# Patient Record
Sex: FEMALE | Race: BLACK OR AFRICAN AMERICAN | Employment: UNEMPLOYED | URBAN - METROPOLITAN AREA
[De-identification: names, ages, dates, MRNs, and addresses within clinical notes are randomized per-mention and may not be internally consistent; named-entity substitution may affect disease eponyms.]

---

## 2018-02-08 LAB
C TRACH DNA SPEC QL PROBE+SIG AMP: NEGATIVE
HBV SURFACE AG SERPL QL IA: NEGATIVE
HCT VFR BLD AUTO: 35.5 %
HEMOGLOBIN: 11 G/DL (ref 11.7–15.7)
HIV 1+2 AB+HIV1 P24 AG SERPL QL IA: NEGATIVE
N GONORRHOEA DNA SPEC QL PROBE+SIG AMP: NEGATIVE
PAP: NEGATIVE
RUBELLA ABY IGG: NORMAL

## 2018-06-14 LAB
GLU GEST SCREEN 1HR 50G: 150
HEMOGLOBIN: 9.9 G/DL (ref 11.7–15.7)

## 2018-07-05 LAB
GLU, 1 HOUR, 100 G: 146
GLU, 2 HOUR, 100 G: 160
GLU, 3 HOUR, 100 G: 81

## 2018-09-10 ENCOUNTER — OFFICE VISIT (OUTPATIENT)
Dept: OBGYN | Facility: CLINIC | Age: 28
End: 2018-09-10
Attending: ADVANCED PRACTICE MIDWIFE
Payer: MEDICAID

## 2018-09-10 VITALS
DIASTOLIC BLOOD PRESSURE: 78 MMHG | BODY MASS INDEX: 32.4 KG/M2 | HEIGHT: 65 IN | SYSTOLIC BLOOD PRESSURE: 126 MMHG | HEART RATE: 105 BPM | WEIGHT: 194.5 LBS

## 2018-09-10 DIAGNOSIS — O09.93 SUPERVISION OF HIGH-RISK PREGNANCY, THIRD TRIMESTER: Primary | ICD-10-CM

## 2018-09-10 PROBLEM — Z34.83 ENCOUNTER FOR SUPERVISION OF OTHER NORMAL PREGNANCY IN THIRD TRIMESTER: Status: ACTIVE | Noted: 2018-09-10

## 2018-09-10 LAB
ABO + RH BLD: NORMAL
ABO + RH BLD: NORMAL
ALT SERPL W P-5'-P-CCNC: 15 U/L (ref 0–50)
AST SERPL W P-5'-P-CCNC: 21 U/L (ref 0–45)
BLD GP AB SCN SERPL QL: NORMAL
BLOOD BANK CMNT PATIENT-IMP: NORMAL
CREAT SERPL-MCNC: 0.5 MG/DL (ref 0.52–1.04)
CREAT UR-MCNC: 115 MG/DL
ERYTHROCYTE [DISTWIDTH] IN BLOOD BY AUTOMATED COUNT: 14.4 % (ref 10–15)
GFR SERPL CREATININE-BSD FRML MDRD: >90 ML/MIN/1.7M2
HCT VFR BLD AUTO: 33.2 % (ref 35–47)
HGB BLD-MCNC: 10.1 G/DL (ref 11.7–15.7)
MCH RBC QN AUTO: 22.8 PG (ref 26.5–33)
MCHC RBC AUTO-ENTMCNC: 30.4 G/DL (ref 31.5–36.5)
MCV RBC AUTO: 75 FL (ref 78–100)
PLATELET # BLD AUTO: 228 10E9/L (ref 150–450)
PROT UR-MCNC: 0.25 G/L
PROT/CREAT 24H UR: 0.22 G/G CR (ref 0–0.2)
RBC # BLD AUTO: 4.43 10E12/L (ref 3.8–5.2)
SPECIMEN EXP DATE BLD: NORMAL
URATE SERPL-MCNC: 4.3 MG/DL (ref 2.6–6)
WBC # BLD AUTO: 9 10E9/L (ref 4–11)

## 2018-09-10 PROCEDURE — 86850 RBC ANTIBODY SCREEN: CPT | Performed by: ADVANCED PRACTICE MIDWIFE

## 2018-09-10 PROCEDURE — 36415 COLL VENOUS BLD VENIPUNCTURE: CPT | Performed by: ADVANCED PRACTICE MIDWIFE

## 2018-09-10 PROCEDURE — 87591 N.GONORRHOEAE DNA AMP PROB: CPT | Performed by: ADVANCED PRACTICE MIDWIFE

## 2018-09-10 PROCEDURE — 87491 CHLMYD TRACH DNA AMP PROBE: CPT | Performed by: ADVANCED PRACTICE MIDWIFE

## 2018-09-10 PROCEDURE — 85027 COMPLETE CBC AUTOMATED: CPT | Performed by: ADVANCED PRACTICE MIDWIFE

## 2018-09-10 PROCEDURE — 82565 ASSAY OF CREATININE: CPT | Performed by: ADVANCED PRACTICE MIDWIFE

## 2018-09-10 PROCEDURE — 84156 ASSAY OF PROTEIN URINE: CPT | Performed by: ADVANCED PRACTICE MIDWIFE

## 2018-09-10 PROCEDURE — 87653 STREP B DNA AMP PROBE: CPT | Performed by: ADVANCED PRACTICE MIDWIFE

## 2018-09-10 PROCEDURE — 86900 BLOOD TYPING SEROLOGIC ABO: CPT | Performed by: ADVANCED PRACTICE MIDWIFE

## 2018-09-10 PROCEDURE — 84450 TRANSFERASE (AST) (SGOT): CPT | Performed by: ADVANCED PRACTICE MIDWIFE

## 2018-09-10 PROCEDURE — 84550 ASSAY OF BLOOD/URIC ACID: CPT | Performed by: ADVANCED PRACTICE MIDWIFE

## 2018-09-10 PROCEDURE — 86901 BLOOD TYPING SEROLOGIC RH(D): CPT | Performed by: ADVANCED PRACTICE MIDWIFE

## 2018-09-10 PROCEDURE — 84460 ALANINE AMINO (ALT) (SGPT): CPT | Performed by: ADVANCED PRACTICE MIDWIFE

## 2018-09-10 ASSESSMENT — ANXIETY QUESTIONNAIRES
1. FEELING NERVOUS, ANXIOUS, OR ON EDGE: SEVERAL DAYS
5. BEING SO RESTLESS THAT IT IS HARD TO SIT STILL: SEVERAL DAYS
3. WORRYING TOO MUCH ABOUT DIFFERENT THINGS: NOT AT ALL
2. NOT BEING ABLE TO STOP OR CONTROL WORRYING: NOT AT ALL
6. BECOMING EASILY ANNOYED OR IRRITABLE: MORE THAN HALF THE DAYS
GAD7 TOTAL SCORE: 5
7. FEELING AFRAID AS IF SOMETHING AWFUL MIGHT HAPPEN: NOT AT ALL

## 2018-09-10 ASSESSMENT — PAIN SCALES - GENERAL: PAINLEVEL: NO PAIN (0)

## 2018-09-10 ASSESSMENT — PATIENT HEALTH QUESTIONNAIRE - PHQ9: 5. POOR APPETITE OR OVEREATING: SEVERAL DAYS

## 2018-09-10 NOTE — LETTER
Date:September 18, 2018      Patient was self referred, no letter generated. Do not send.        [facility] Information

## 2018-09-10 NOTE — MR AVS SNAPSHOT
After Visit Summary   9/10/2018    Libby Mahajan    MRN: [id number]           Patient Information     Date Of Birth          1990        Visit Information        Provider Department      9/10/2018 1:30 PM Alexander Galaviz CNM [facility]        Today's Diagnoses     Supervision of high-risk pregnancy, third trimester    -  1    Large for dates           Follow-ups after your visit        Follow-up notes from your care team     Return in about 1 week (around 9/17/2018) for ultrasound and 30 minute ed appt.      Your next 10 appointments already scheduled     Sep 17, 2018 10:00 AM CDT   (Arrive by 9:45 AM)   US OB SINGLE FOLLOW UP REPEAT with URSUS1   [facility] ([facility])    [facility]  3rd Floor, [address]  [address] [place] [address] [phone]           How do I prepare for my exam? (Food and drink instructions) Drink four 8-ounce glasses of fluid an hour before your exam. If you need to empty your bladder before your exam, try to release only a little urine. Then, drink another glass of fluid.  How do I prepare for my exam? (Other instructions) You may have up to two family members in the exam room. If you bring a small child, an adult must be there to care for him or her. No video or camera photography during the procedure.  What should I wear: Wear comfortable clothes.  How long does the exam take: Most ultrasounds take 30 to 60 minutes.  What should I bring: Bring a list of your medicines, including vitamins, minerals and over-the-counter drugs. It is safest to leave personal items at home.  Do I need a :  No  is needed.  What do I need to tell my doctor: Tell your doctor about any allergies you may have.  What should I do after the exam: No restrictions, You may resume normal activities.  What is this test: An ultrasound uses sound waves to make pictures of  "the body. Sound waves do not cause pain. The only discomfort may be the pressure of the wand against your skin or full bladder.  Who should I call with questions: If you have any questions, please call the Imaging Department where you will have your exam. Directions, parking instructions, and other information is available on our website, [email or web address].            Sep 17, 2018 10:30 AM CDT   RETURN OB with AHMET Xie CNM   [facility] ([facility])    [facility]  [address]  [address]  [facility] [address] [phone]              Who to contact     Please call your clinic at [phone] to:    Ask questions about your health    Make or cancel appointments    Discuss your medicines    Learn about your test results    Speak to your doctor            Additional Information About Your Visit        Care EveryWhere ID     This is your Care EveryWhere ID. This could be used by other organizations to access your [place] medical records  [id number]        Your Vitals Were     Pulse Height Last Period BMI (Body Mass Index)          105 1.651 m (5' 5\") 12/27/2017 32.37 kg/m2         Blood Pressure from Last 3 Encounters:   09/10/18 126/78    Weight from Last 3 Encounters:   09/10/18 88.2 kg (194 lb 8 oz)              We Performed the Following     ABO/Rh Type and Screen     ALT     AST     CBC with Platelets     Chlamydia trachomatis PCR     Creatinine urine calculation only     Creatinine     Gonorrhea PCR     Group B strep PCR     Protein  random urine with Creat Ratio     Uric acid        Primary Care Provider    None Specified       No primary provider on file.        Equal Access to Services     JENNIFER FIGUEROA : Hadii vipul Leos, wayaseminda lurohitadaha, qaybta kaalmada ladi mascorro . So [facility] [phone].    ATENCIÓN: Si habla español, tiene a bhat disposición servicios gratuitos de " wil lingüísticyanira. Raj al [phone].    We comply with applicable federal civil rights laws and [place] laws. We do not discriminate on the basis of race, color, national origin, age, disability, sex, sexual orientation, or gender identity.            Thank you!     Thank you for choosing [facility]  for your care. Our goal is always to provide you with excellent care. Hearing back from our patients is one way we can continue to improve our services. Please take a few minutes to complete the written survey that you may receive in the mail after your visit with us. Thank you!             Your Updated Medication List - Protect others around you: Learn how to safely use, store and throw away your medicines at www.disposemymeds.org.      Notice  As of 9/10/2018 11:59 PM    You have not been prescribed any medications.

## 2018-09-10 NOTE — PROGRESS NOTES
"SUBJECTIVE:   Libby is a 28 year old female who presents to clinic for a TRANSFER OF CARE visit.       at 36w5d with Estimated Date of Delivery: Oct 3, 2018 based on LMP (per patient report. No ultrasound records available).     Transfer of care from [facility].   Pt reports she received regular prenatal care. Moved here one month ago- did not explain why she did not present for care earlier. Present at appointment with her mother. , \"Jurgen,\" is in the [facility].   - States she had her records but lost them during the move.     Per pt report:   - Unclear history of elevated blood pressures:   -  Pt reports elevated BP early in pregnancy and was on anti-HTN medication.    - States meds were d'c'ed at approx 5 months of pregnancy.    - Reports that she had elevated BPs after delivery with previous pregnancy.  - Never diagnosed with CHTN,   - Agreeable to baseline pre-e labs today. Normotensive in clinic today.  - Failed 1 hour glucose, PASSED 3 hour   - States she did 3 hour glucose test this pregnancy and passed  - Gallstones- during this pregnancy  - Does not think she had GC/CT testing. Agreeable to testing today.    Reports no other problems in pregnancy.     OB history:   - 2 prior    - 2009 39+0, Girl, 6lb 13oz   - 3/8/2016 @ 39+2, Boy, 8lb 12oz    Denies cramping/contractions, vaginal bleeding, discharge or leakage of fluid. Report +fetal movement.  No HA, vision changes, ruq/epigastric pain.      ===========================================   ROS: 10 point ROS neg other than the symptoms noted above in the HPI.      PSYCHIATRIC:  Denies mood changes, difficulty concentrating, depression, anxiety, panic attacks or post partum depression  PHQ9= 8, GAD7= 5    Past History:  Her past medical history   Past Medical History:   Diagnosis Date     Gallstone     2018 in pregnancy   .   HISTORY:  Family History   Problem Relation Age of Onset     Hypertension Mother      Asthma Mother  " "    Social History     Social History     Marital status:      Spouse name: Jurgen     Number of children: N/A     Years of education: N/A     Social History Main Topics     Smoking status: Never Smoker     Smokeless tobacco: Never Used     Alcohol use Yes      Comment: rare before stopped in pregnancy     Drug use: No     Sexual activity: Yes     Partners: Male     Other Topics Concern     None     Social History Narrative    How much exercise per week? No much    How much calcium per day? In foods  In dairy       How much caffeine per day? 0    How much vitamin D per day? In foods    Do you/your family wear seatbelts?  Yes    Do you/your family use safety helmets? Yes    Do you/your family use sunscreen? Yes    Do you/your family keep firearms in the home? No    Do you/your family have a smoke detector(s)? Yes            Reviewed cmckim lpn 9-             No current outpatient prescriptions on file.     No current facility-administered medications for this visit.      No Known Allergies    ============================================  MEDICAL HISTORY  Past Medical History:   Diagnosis Date     Gallstone     2018 in pregnancy     Past Surgical History:   Procedure Laterality Date     NO HISTORY OF SURGERY         Obstetric History       T2      L2     SAB0   TAB0   Ectopic0   Multiple0   Live Births0       # Outcome Date GA Lbr Elie/2nd Weight Sex Delivery Anes PTL Lv   3 Current            2 Term 16 39w2d  3.969 kg (8 lb 12 oz) M          Name: Jurgen Sales Term 09 39w0d  3.09 kg (6 lb 13 oz) F          Name: Kelly          I personally reviewed the past social/family/medical and surgical history on the date of service.   I reviewed lab work done at Intake visit with patient.    EXAM:  /78  Pulse 105  Ht 1.651 m (5' 5\")  Wt 88.2 kg (194 lb 8 oz)  LMP 2017  BMI 32.37 kg/m2   EXAM:  GENERAL:  Pleasant pregnant female, alert, cooperative and well " groomed.  SKIN:  Warm and dry, without lesions or rashes  HEAD: Symmetrical features.  MOUTH:  Buccal mucosa pink, moist without lesions.  Teeth in good repair.    NECK:  Thyroid without enlargement and nodules.  Lymph nodes not palpable.   LUNGS:  Clear to auscultation.  BREAST: deferred   HEART:  RRR without murmur.  ABDOMEN: Soft without masses , tenderness or organomegaly.  No CVA tenderness.  Uterus palpable at size equal to dates.  No scars noted.. Fetal heart tones present.  MUSCULOSKELETAL:  Full range of motion  EXTREMITIES:  No edema. No significant varicosities.   PELVIC EXAM: deferred  GC/CHLAMYDIA CULTURE OBTAINED:YES    ASSESSMENT:  28 year old , 36w5d weeks of pregnancy with MARIA DEL CARMEN of Oct 3, 2018 by LMP.   No ultrasound records available.      ICD-10-CM    1. Supervision of high-risk pregnancy, third trimester O09.93 Group B strep PCR     CBC with Platelets     AST     ALT     Protein  random urine with Creat Ratio     Creatinine     Uric acid     Chlamydia trachomatis PCR     Gonorrhea PCR     ABO/Rh Type and Screen     US OB Single Follow Up Repeat     Creatinine urine calculation only     Hgb with reflex to ELP or RBC Solubility (Sickle Cell Screen)     Hepatitis B Surface Antigen     25- OH-Vitamin D     HIV Antigen Antibody Combo     Rubella Antibody IgG Quantitative     Treponema Abs w Reflex to RPR and Titer   2. Large for dates P08.1 Group B strep PCR     CBC with Platelets     AST     ALT     Protein  random urine with Creat Ratio     Creatinine     Uric acid     Chlamydia trachomatis PCR     Gonorrhea PCR     ABO/Rh Type and Screen     US OB Single Follow Up Repeat     Creatinine urine calculation only     Hgb with reflex to ELP or RBC Solubility (Sickle Cell Screen)     Hepatitis B Surface Antigen     25- OH-Vitamin D     HIV Antigen Antibody Combo     Rubella Antibody IgG Quantitative     Treponema Abs w Reflex to RPR and Titer       PLAN:  - Reviewed use of triage nurse line and  contacting the on-call provider after hours for an urgent need such as fever, vagina bleeding, bladder or vaginal infection, rupture of membranes,  or term labor.    - Reviewed CNM and MD care.   Desires CNM care.    - All pt's questions discussed and answered.  Pt verbalized understanding of and agreement to plan of care.     - Reviewed prenatal course per by patient report of history as no written records are available.  - RN attempting to obtain records.    - GBS obtained.  - Abo/Rh T&S ordered.  - GC/CT collected.  - Pre-e labs ordered d/t pt report of elevated BP on anti-HTN medication at beginning of pregnancy and elevated BP at end of previous pregnancy?  - Plan growth ultrasound with next visit d/t fundal height greater than dates.    - Continue SARAH visit next week if records available. Review EOB ed.     - Continue scheduled prenatal care, RTC in 1 week for growth ultrasound and 30 min visit and prn if questions or concerns    AHMET Guan CNM    Addendum:    Clinic RN has tried multiple times to obtain records and has been unsuccessful.  - Future orders placed for remaining routine prenatal labs (spoke with lab, unable to add on to previously drawn labs).  - Consider drawing these labs after discussion at next visit and/or if records still not available.  - Review EOB education at next visit.  - Review any more information re: hx of elevated BP.  May need to consult with OB MD team re: any recommendations for  surveillance or delivery timing.    AHMET Guan CNM

## 2018-09-11 LAB
C TRACH DNA SPEC QL NAA+PROBE: NEGATIVE
GP B STREP DNA SPEC QL NAA+PROBE: NEGATIVE
N GONORRHOEA DNA SPEC QL NAA+PROBE: NEGATIVE
SPECIMEN SOURCE: NORMAL

## 2018-09-11 ASSESSMENT — PATIENT HEALTH QUESTIONNAIRE - PHQ9: SUM OF ALL RESPONSES TO PHQ QUESTIONS 1-9: 8

## 2018-09-11 ASSESSMENT — ANXIETY QUESTIONNAIRES: GAD7 TOTAL SCORE: 5

## 2018-09-12 ENCOUNTER — TRANSFERRED RECORDS (OUTPATIENT)
Dept: HEALTH INFORMATION MANAGEMENT | Facility: CLINIC | Age: 28
End: 2018-09-12

## 2018-09-16 PROBLEM — Z34.83 ENCOUNTER FOR SUPERVISION OF OTHER NORMAL PREGNANCY IN THIRD TRIMESTER: Status: RESOLVED | Noted: 2018-09-10 | Resolved: 2018-09-16

## 2018-09-16 PROBLEM — O09.93 SUPERVISION OF HIGH-RISK PREGNANCY, THIRD TRIMESTER: Status: ACTIVE | Noted: 2018-09-16

## 2018-09-17 ENCOUNTER — OFFICE VISIT (OUTPATIENT)
Dept: OBGYN | Facility: CLINIC | Age: 28
End: 2018-09-17
Attending: ADVANCED PRACTICE MIDWIFE
Payer: MEDICAID

## 2018-09-17 ENCOUNTER — RADIANT APPOINTMENT (OUTPATIENT)
Dept: ULTRASOUND IMAGING | Facility: CLINIC | Age: 28
End: 2018-09-17
Attending: ADVANCED PRACTICE MIDWIFE
Payer: MEDICAID

## 2018-09-17 VITALS
HEIGHT: 65 IN | BODY MASS INDEX: 32.54 KG/M2 | SYSTOLIC BLOOD PRESSURE: 129 MMHG | HEART RATE: 108 BPM | DIASTOLIC BLOOD PRESSURE: 79 MMHG | WEIGHT: 195.3 LBS

## 2018-09-17 DIAGNOSIS — O09.93 SUPERVISION OF HIGH-RISK PREGNANCY, THIRD TRIMESTER: Primary | ICD-10-CM

## 2018-09-17 DIAGNOSIS — Z34.93 ENCOUNTER FOR SUPERVISION OF NORMAL PREGNANCY IN THIRD TRIMESTER, UNSPECIFIED GRAVIDITY: ICD-10-CM

## 2018-09-17 PROCEDURE — 76816 OB US FOLLOW-UP PER FETUS: CPT

## 2018-09-17 PROCEDURE — G0463 HOSPITAL OUTPT CLINIC VISIT: HCPCS | Mod: ZF

## 2018-09-17 PROCEDURE — 40000809 ZZH STATISTIC NO DOCUMENTATION TO SUPPORT CHARGE

## 2018-09-17 RX ORDER — CALCIUM CARBONATE 500 MG/1
1 TABLET, CHEWABLE ORAL 2 TIMES DAILY
COMMUNITY
End: 2018-10-04

## 2018-09-17 ASSESSMENT — PAIN SCALES - GENERAL: PAINLEVEL: NO PAIN (0)

## 2018-09-17 NOTE — LETTER
"2018       RE: Libby Mahajan  [address] [place] [facility] [address]     Dear Colleague,    Thank you for referring your patient, Libby Mahajan, to [facility] Please see a copy of my visit note below.    Subjective:      28 year old  at 37w5d presents for a routine prenatal appointment.         No vaginal bleeding,  leakage of fluid, or change in vaginal discharge.  No contractions.  Normal daily fetal movement.  Occasional HA that resolve with tylenol, visual changes, RUQ or epigastric pain.   Patient concerns: Feeling fatigued overall. Hopefully that  will be able to move here from [place] soon. It has been stressful to be far away from him. Pt has the following concerns: 1. Back pain for the past week 2. Leg cramps b/l. No pain with ambulation, redness or warmth. Occasional b/l swelling 3. Wants to discuss LGA 4. Heartburn - taking tums, which help somewhat  Objective:  Vitals:    18 1032   BP: 129/79   Pulse: 108   Weight: 88.6 kg (195 lb 4.8 oz)   Height: 1.651 m (5' 5\")    See OB flowsheet    Prenatal labs reviewed from transfer records  2018: Bloody type O positive, ab neg, h/h 35.3/11.0, mcv 73, Rubella immune, RPR NR, Urine neg, HIV neg, Hep B neg, Hg electrophoresis AA, GC/CT neg/neg.    Assessment/Plan     Encounter Diagnosis   Name Primary?     Supervision of high-risk pregnancy, third trimester Yes       Orders Placed This Encounter   Medications     calcium carbonate (TUMS) 500 MG chewable tablet     Sig: Take 1 chew tab by mouth 2 times daily       PHQ-9 SCORE 9/10/2018   Total Score 8     GBS screening: neg result reviewed  Birth preferences reviewed: Un-Medicated, open to fentanyl and nitrous.  Discussed stretches and maternity support belt for low back pain  Discussed implications of LGA baby including increased risk for  and shoulder dystocia. Advised frequent " walks, low glycemic diet. Reviewed parameters for offering primary  for LGA.   Advised zantac prn for heartburn  Labor signs discussed. Reinforced daily fetal movement counts.  Reviewed why/how to contact provider if headache/visual changes/RUQ or epigastric pain, decreased fetal movement, vaginal bleeding, leakage of fluid.  Return to clinic in 1 week and prn if questions or concerns.     AHMET BishopM

## 2018-09-17 NOTE — MR AVS SNAPSHOT
"              After Visit Summary   9/17/2018    Libby Mahajan    MRN: [id number]           Patient Information     Date Of Birth          1990        Visit Information        Provider Department      9/17/2018 10:30 AM Daisha Bonds APRN CNM [facility]        Today's Diagnoses     Supervision of high-risk pregnancy, third trimester    -  1       Follow-ups after your visit        Follow-up notes from your care team     Return in about 1 week (around 9/24/2018) for Return OB.      Your next 10 appointments already scheduled     Sep 24, 2018 11:00 AM CDT   RETURN OB with Alexander Galaviz CNM   [facility] ([facility])    [facility]  [address]  [address]  [facility] [address] [phone]              Who to contact     Please call your clinic at [phone] to:    Ask questions about your health    Make or cancel appointments    Discuss your medicines    Learn about your test results    Speak to your doctor            Additional Information About Your Visit        Care EveryWhere ID     This is your Care EveryWhere ID. This could be used by other organizations to access your [facility] medical records  [id number]        Your Vitals Were     Pulse Height Last Period BMI (Body Mass Index)          108 1.651 m (5' 5\") 12/27/2017 32.5 kg/m2         Blood Pressure from Last 3 Encounters:   09/17/18 129/79   09/10/18 126/78    Weight from Last 3 Encounters:   09/17/18 88.6 kg (195 lb 4.8 oz)   09/10/18 88.2 kg (194 lb 8 oz)              Today, you had the following     No orders found for display       Primary Care Provider    None Specified       No primary provider on file.        Equal Access to Services     GAGE FIGUEROA : madelin Small, ladi reynoso. So [facility] [phone].    ATENCIÓN: Si pete forbes " bhat disposición servicios gratuitos de asistencia lingüística. Raj mclaughlin [phone].    We comply with applicable federal civil rights laws and [place] laws. We do not discriminate on the basis of race, color, national origin, age, disability, sex, sexual orientation, or gender identity.            Thank you!     Thank you for choosing [facility]  for your care. Our goal is always to provide you with excellent care. Hearing back from our patients is one way we can continue to improve our services. Please take a few minutes to complete the written survey that you may receive in the mail after your visit with us. Thank you!             Your Updated Medication List - Protect others around you: Learn how to safely use, store and throw away your medicines at www.disposemymeds.org.          This list is accurate as of 9/17/18 11:18 AM.  Always use your most recent med list.                   Brand Name Dispense Instructions for use Diagnosis    calcium carbonate 500 MG chewable tablet    TUMS     Take 1 chew tab by mouth 2 times daily

## 2018-09-17 NOTE — PROGRESS NOTES
"Subjective:      28 year old  at 37w5d presents for a routine prenatal appointment.         No vaginal bleeding,  leakage of fluid, or change in vaginal discharge.  No contractions.  Normal daily fetal movement.  Occasional HA that resolve with tylenol, visual changes, RUQ or epigastric pain.   Patient concerns: Feeling fatigued overall. Hopefully that  will be able to move here from [place] soon. It has been stressful to be far away from him. Pt has the following concerns: 1. Back pain for the past week 2. Leg cramps b/l. No pain with ambulation, redness or warmth. Occasional b/l swelling 3. Wants to discuss LGA 4. Heartburn - taking tums, which help somewhat  Objective:  Vitals:    18 1032   BP: 129/79   Pulse: 108   Weight: 88.6 kg (195 lb 4.8 oz)   Height: 1.651 m (5' 5\")    See OB flowsheet    Prenatal labs reviewed from transfer records  2018: Bloody type O positive, ab neg, h/h 35.3/11.0, mcv 73, Rubella immune, RPR NR, Urine neg, HIV neg, Hep B neg, Hg electrophoresis AA, GC/CT neg/neg.    Assessment/Plan     Encounter Diagnosis   Name Primary?     Supervision of high-risk pregnancy, third trimester Yes       Orders Placed This Encounter   Medications     calcium carbonate (TUMS) 500 MG chewable tablet     Sig: Take 1 chew tab by mouth 2 times daily       PHQ-9 SCORE 9/10/2018   Total Score 8     GBS screening: neg result reviewed  Birth preferences reviewed: Un-Medicated, open to fentanyl and nitrous.  Discussed stretches and maternity support belt for low back pain  Discussed implications of LGA baby including increased risk for  and shoulder dystocia. Advised frequent walks, low glycemic diet. Reviewed parameters for offering primary  for LGA.   Advised zantac prn for heartburn  Labor signs discussed. Reinforced daily fetal movement counts.  Reviewed why/how to contact provider if headache/visual changes/RUQ or epigastric pain, decreased fetal movement, " vaginal bleeding, leakage of fluid.  Return to clinic in 1 week and prn if questions or concerns.     Daisha Bonds, APRN CNM

## 2018-09-24 ENCOUNTER — OFFICE VISIT (OUTPATIENT)
Dept: OBGYN | Facility: CLINIC | Age: 28
End: 2018-09-24
Attending: ADVANCED PRACTICE MIDWIFE
Payer: MEDICAID

## 2018-09-24 ENCOUNTER — HOSPITAL ENCOUNTER (OUTPATIENT)
Facility: CLINIC | Age: 28
Discharge: HOME | End: 2018-09-24
Attending: ADVANCED PRACTICE MIDWIFE | Admitting: ADVANCED PRACTICE MIDWIFE
Payer: MEDICAID

## 2018-09-24 VITALS
HEART RATE: 96 BPM | RESPIRATION RATE: 20 BRPM | HEIGHT: 65 IN | BODY MASS INDEX: 31.82 KG/M2 | WEIGHT: 191 LBS | DIASTOLIC BLOOD PRESSURE: 85 MMHG | SYSTOLIC BLOOD PRESSURE: 137 MMHG | TEMPERATURE: 98.4 F

## 2018-09-24 VITALS
BODY MASS INDEX: 31.78 KG/M2 | WEIGHT: 191 LBS | HEART RATE: 91 BPM | SYSTOLIC BLOOD PRESSURE: 149 MMHG | DIASTOLIC BLOOD PRESSURE: 80 MMHG

## 2018-09-24 DIAGNOSIS — O09.93 SUPERVISION OF HIGH-RISK PREGNANCY, THIRD TRIMESTER: Primary | ICD-10-CM

## 2018-09-24 DIAGNOSIS — O99.013 ANEMIA DURING PREGNANCY IN THIRD TRIMESTER: ICD-10-CM

## 2018-09-24 DIAGNOSIS — Z34.93 PREGNANCY WITH PRENATAL CARE ELSEWHERE IN THIRD TRIMESTER: ICD-10-CM

## 2018-09-24 DIAGNOSIS — O16.3 HYPERTENSION DURING PREGNANCY IN THIRD TRIMESTER, UNSPECIFIED HYPERTENSION IN PREGNANCY TYPE: ICD-10-CM

## 2018-09-24 DIAGNOSIS — O99.013 ANEMIA DURING PREGNANCY IN THIRD TRIMESTER: Primary | ICD-10-CM

## 2018-09-24 DIAGNOSIS — O09.93 SUPERVISION OF HIGH-RISK PREGNANCY, THIRD TRIMESTER: ICD-10-CM

## 2018-09-24 LAB
ALT SERPL W P-5'-P-CCNC: 15 U/L (ref 0–50)
ANION GAP SERPL CALCULATED.3IONS-SCNC: 10 MMOL/L (ref 3–14)
AST SERPL W P-5'-P-CCNC: 19 U/L (ref 0–45)
BUN SERPL-MCNC: 5 MG/DL (ref 7–30)
CALCIUM SERPL-MCNC: 8.2 MG/DL (ref 8.5–10.1)
CHLORIDE SERPL-SCNC: 106 MMOL/L (ref 94–109)
CO2 SERPL-SCNC: 21 MMOL/L (ref 20–32)
CREAT SERPL-MCNC: 0.37 MG/DL (ref 0.52–1.04)
CREAT UR-MCNC: 96 MG/DL
ERYTHROCYTE [DISTWIDTH] IN BLOOD BY AUTOMATED COUNT: 14.6 % (ref 10–15)
GFR SERPL CREATININE-BSD FRML MDRD: >90 ML/MIN/1.7M2
GLUCOSE SERPL-MCNC: 71 MG/DL (ref 70–99)
HCT VFR BLD AUTO: 30.7 % (ref 35–47)
HGB BLD-MCNC: 9.4 G/DL (ref 11.7–15.7)
MCH RBC QN AUTO: 22.4 PG (ref 26.5–33)
MCHC RBC AUTO-ENTMCNC: 30.6 G/DL (ref 31.5–36.5)
MCV RBC AUTO: 73 FL (ref 78–100)
PLATELET # BLD AUTO: 237 10E9/L (ref 150–450)
POTASSIUM SERPL-SCNC: 3.8 MMOL/L (ref 3.4–5.3)
PROT UR-MCNC: 0.22 G/L
PROT/CREAT 24H UR: 0.23 G/G CR (ref 0–0.2)
RBC # BLD AUTO: 4.19 10E12/L (ref 3.8–5.2)
SODIUM SERPL-SCNC: 137 MMOL/L (ref 133–144)
WBC # BLD AUTO: 6.8 10E9/L (ref 4–11)

## 2018-09-24 PROCEDURE — 84450 TRANSFERASE (AST) (SGOT): CPT | Performed by: ADVANCED PRACTICE MIDWIFE

## 2018-09-24 PROCEDURE — 59025 FETAL NON-STRESS TEST: CPT

## 2018-09-24 PROCEDURE — 84156 ASSAY OF PROTEIN URINE: CPT | Performed by: ADVANCED PRACTICE MIDWIFE

## 2018-09-24 PROCEDURE — G0463 HOSPITAL OUTPT CLINIC VISIT: HCPCS | Mod: 25

## 2018-09-24 PROCEDURE — 84460 ALANINE AMINO (ALT) (SGPT): CPT | Performed by: ADVANCED PRACTICE MIDWIFE

## 2018-09-24 PROCEDURE — 36415 COLL VENOUS BLD VENIPUNCTURE: CPT | Performed by: ADVANCED PRACTICE MIDWIFE

## 2018-09-24 PROCEDURE — 80048 BASIC METABOLIC PNL TOTAL CA: CPT | Performed by: ADVANCED PRACTICE MIDWIFE

## 2018-09-24 PROCEDURE — 85027 COMPLETE CBC AUTOMATED: CPT | Performed by: ADVANCED PRACTICE MIDWIFE

## 2018-09-24 RX ORDER — FERROUS SULFATE 325(65) MG
325 TABLET ORAL
Qty: 30 TABLET | Refills: 2 | Status: ON HOLD | OUTPATIENT
Start: 2018-09-24 | End: 2018-09-26

## 2018-09-24 RX ORDER — ONDANSETRON 2 MG/ML
4 INJECTION INTRAMUSCULAR; INTRAVENOUS EVERY 6 HOURS PRN
Status: DISCONTINUED | OUTPATIENT
Start: 2018-09-24 | End: 2018-09-24 | Stop reason: HOSPADM

## 2018-09-24 ASSESSMENT — PAIN SCALES - GENERAL: PAINLEVEL: NO PAIN (0)

## 2018-09-24 NOTE — IP AVS SNAPSHOT
[facility]    [address]    [place] [address]    Phone:  [phone]                                       After Visit Summary   9/24/2018    Libby Mahajan    MRN: [id number]           After Visit Summary Signature Page     I have received my discharge instructions, and my questions have been answered. I have discussed any challenges I see with this plan with the nurse or doctor.    ..........................................................................................................................................  Patient/Patient Representative Signature      ..........................................................................................................................................  Patient Representative Print Name and Relationship to Patient    ..................................................               ................................................  Date                                   Time    ..........................................................................................................................................  Reviewed by Signature/Title    ...................................................              ..............................................  Date                                               Time          22EPIC Rev 08/18

## 2018-09-24 NOTE — DISCHARGE INSTRUCTIONS
Discharge Instruction for Undelivered Patients      You were seen for: Blood pressure assessment  We Consulted: Dhruv Cavazos  You had (Test or Medicine): fetal monitor and labs     Diet:   Drink 8 to 12 glasses of liquids (milk, juice, water) every day.  You may eat meals and snacks.     Activity:  Call your doctor or nurse midwife if your baby is moving less than usual.     Call your provider if you notice:  Swelling in your face or increased swelling in your hands or legs.  Headaches that are not relieved by Tylenol (acetaminophen).  Changes in your vision (blurring: seeing spots or stars.)  Nausea (sick to your stomach) and vomiting (throwing up).   Weight gain of 5 pounds or more per week.  Heartburn that doesn't go away.  Signs of bladder infection: pain when you urinate (use the toilet), need to go more often and more urgently.  The bag of villaseñor (rupture of membranes) breaks, or you notice leaking in your underwear.  Bright red blood in your underwear.  Abdominal (lower belly) or stomach pain.  Second (plus) baby: Contractions (tightening) less than 10 minutes apart and getting stronger.  Increase or change in vaginal discharge (note the color and amount)    Follow-up:  As scheduled in the clinic on Wednesday, plan for induction of labor on Friday 9/28 at 9am on [facility].

## 2018-09-24 NOTE — LETTER
2018     RE: Libby Mahajan  [address] [facility] [address]     Dear Colleague,    Thank you for referring your patient, Libby Mahajan, to the [facility] at [facility]. Please see a copy of my visit note below.    Subjective:     28 year old  at 38w5d presents for routine prenatal visit with her mother.            no vaginal bleeding or leakage of fluid.  irregular contractions.  + fetal movement.        No HA, visual changes, RUQ or epigastric pain.   Patient concerns:   Feeling well overall.  - Noted GBS negative.  - Noted initial BP with elevated DBP >140.  Agreeable to serial BPs and Pre E labs in triage.  Pt just [facility] Thursday night so feels she knows where to go on the 4th floor, declines escort.   Objective:  Vitals:    18 1124   BP: 149/80   Pulse: 91   Weight: 86.6 kg (191 lb)    See OB flowsheet  Assessment/Plan     Encounter Diagnoses   Name Primary?     Supervision of high-risk pregnancy, third trimester Yes     Large for dates- FH > dates      Pregnancy with prenatal care elsewhere in third trimester      Hypertension during pregnancy in third trimester, unspecified hypertension in pregnancy type      Anemia during pregnancy in third trimester      - Reviewed recommendation for triage for Pre E labs and serial BPs on L&D with NST and SVE (per pt request) done in unit.   Reviewed if Gestational HTN or Pre E diagnosis and >37 weeks will be recommended to stay for IOL.  If labs WNL and BPs normotensive or not meeting criteria d/t time spacing then RTO Thursday or Friday for BP check.   - Reinforced warning s/s of Pre E and how/why to contact CNM prn.   - Charge RN and King NATASHA updated.       Ya Figueroa, AHMET KAUR    ADDENDUM - noted in prenatal records from previous provider under media that pt was diagnosed with Chronic HTN before 20 weeks of pregnancy and was on Labetalol.  Consider IOL at  39 weeks for CHTN if no pre eclampsia today.  Ya LEO, CNM

## 2018-09-24 NOTE — IP AVS SNAPSHOT
MRN:[id number]                      After Visit Summary   9/24/2018    Libby Mahajan    MRN: [id number]           Thank you!     Thank you for choosing [facility] for your care. Our goal is always to provide you with excellent care. Hearing back from our patients is one way we can continue to improve our services. Please take a few minutes to complete the written survey that you may receive in the mail after you visit with us. Thank you!        Patient Information     Date Of Birth          1990        About your hospital stay     You were admitted on:  September 24, 2018 You last received care in the:  [facility]    You were discharged on:  September 24, 2018       Who to Call     For medical emergencies, please call 911.  For non-urgent questions about your medical care, please call your primary care provider or clinic, None          Attending Provider     Provider Specialty    Dhruv Cavazos APRN [facility]       Primary Care Provider Fax #    Physician No Ref-Primary [phone]      Further instructions from your care team       Discharge Instruction for Undelivered Patients      You were seen for: Blood pressure assessment  We Consulted: Dhruv Cavazos  You had (Test or Medicine): fetal monitor and labs     Diet:   Drink 8 to 12 glasses of liquids (milk, juice, water) every day.  You may eat meals and snacks.     Activity:  Call your doctor or nurse midwife if your baby is moving less than usual.     Call your provider if you notice:  Swelling in your face or increased swelling in your hands or legs.  Headaches that are not relieved by Tylenol (acetaminophen).  Changes in your vision (blurring: seeing spots or stars.)  Nausea (sick to your stomach) and vomiting (throwing up).   Weight gain of 5 pounds or more per week.  Heartburn that doesn't go away.  Signs of bladder infection: pain when you urinate (use the toilet), need to go more often and more urgently.  The bag of villaseñor (rupture of  "membranes) breaks, or you notice leaking in your underwear.  Bright red blood in your underwear.  Abdominal (lower belly) or stomach pain.  Second (plus) baby: Contractions (tightening) less than 10 minutes apart and getting stronger.  Increase or change in vaginal discharge (note the color and amount)    Follow-up:  As scheduled in the clinic on Wednesday, plan for induction of labor on Friday 9/28 at 9am on [facility].           Pending Results     No orders found from 9/22/2018 to 9/25/2018.            Statement of Approval     Ordered          09/24/18 1311  I have reviewed and agree with all the recommendations and orders detailed in this document.  EFFECTIVE NOW     Approved and electronically signed by:  Dhruv Cavazos APRN CNM             Admission Information     Date & Time Provider Department Dept. Phone    9/24/2018 Dhruv Cavazos APRN CNM [facility] [phone]      Your Vitals Were     Blood Pressure Pulse Temperature Respirations Height Weight    130/85 96 98.4  F (36.9  C) (Oral) 20 1.651 m (5' 5\") 86.6 kg (191 lb)    Last Period BMI (Body Mass Index)                12/27/2017 31.78 kg/m2          Care EveryWhere ID     This is your Care EveryWhere ID. This could be used by other organizations to access your [facility] medical records  [id number]        Equal Access to Services     GAGE FIGUEROA AH: Hadii vipul fitzgerald hadasho Soomaali, waaxda luqadaha, qaybta kaalmada adeegyada, ladi good. So [facility] [phone].    ATENCIÓN: Si habla español, tiene a bhat disposición servicios gratuitos de asistencia lingüística. Llame al [phone].    We comply with applicable federal civil rights laws and [place] laws. We do not discriminate on the basis of race, color, national origin, age, disability, sex, sexual orientation, or gender identity.               Review of your medicines      UNREVIEWED medicines. Ask your doctor about these medicines     "    Dose / Directions    calcium carbonate 500 MG chewable tablet   Commonly known as:  TUMS        Dose:  1 chew tab   Take 1 chew tab by mouth 2 times daily   Refills:  0       TYLENOL PO        Dose:  650 mg   Take 650 mg by mouth as needed for mild pain or fever   Refills:  0         START taking        Dose / Directions    doxylamine 25 MG Tabs tablet   Commonly known as:  UNISOM   Used for:  Supervision of high-risk pregnancy, third trimester        Dose:  25 mg   Take 1 tablet (25 mg) by mouth At Bedtime   Quantity:  30 each   Refills:  1       ferrous sulfate 325 (65 Fe) MG tablet   Commonly known as:  IRON   Used for:  Anemia during pregnancy in third trimester        Dose:  325 mg   Take 1 tablet (325 mg) by mouth daily (with breakfast)   Quantity:  30 tablet   Refills:  2            Where to get your medicines      These medications were sent to [id number] [place], [place] - [address], [place] [address]     Phone:  [phone]     doxylamine 25 MG Tabs tablet    ferrous sulfate 325 (65 Fe) MG tablet                Protect others around you: Learn how to safely use, store and throw away your medicines at www.disposemymeds.org.             Medication List: This is a list of all your medications and when to take them. Check marks below indicate your daily home schedule. Keep this list as a reference.      Medications           Morning Afternoon Evening Bedtime As Needed    calcium carbonate 500 MG chewable tablet   Commonly known as:  TUMS   Take 1 chew tab by mouth 2 times daily                                doxylamine 25 MG Tabs tablet   Commonly known as:  UNISOM   Take 1 tablet (25 mg) by mouth At Bedtime                                ferrous sulfate 325 (65 Fe) MG tablet   Commonly known as:  IRON   Take 1 tablet (325 mg) by mouth daily (with breakfast)                                TYLENOL PO   Take 650 mg by mouth as needed for mild pain or fever

## 2018-09-24 NOTE — PROGRESS NOTES
Subjective:     28 year old  at 38w5d presents for routine prenatal visit with her mother.            no vaginal bleeding or leakage of fluid.  irregular contractions.  + fetal movement.        No HA, visual changes, RUQ or epigastric pain.   Patient concerns:   Feeling well overall.  - Noted GBS negative.  - Noted initial BP with elevated DBP >140.  Agreeable to serial BPs and Pre E labs in triage.  Pt just [facility] Thursday night so feels she knows where to go on the 4th floor, declines escort.   Objective:  Vitals:    18 1124   BP: 149/80   Pulse: 91   Weight: 86.6 kg (191 lb)    See OB flowsheet  Assessment/Plan     Encounter Diagnoses   Name Primary?     Supervision of high-risk pregnancy, third trimester Yes     Large for dates- FH > dates      Pregnancy with prenatal care elsewhere in third trimester      Hypertension during pregnancy in third trimester, unspecified hypertension in pregnancy type      Anemia during pregnancy in third trimester      - Reviewed recommendation for triage for Pre E labs and serial BPs on L&D with NST and SVE (per pt request) done in unit.   Reviewed if Gestational HTN or Pre E diagnosis and >37 weeks will be recommended to stay for IOL.  If labs WNL and BPs normotensive or not meeting criteria d/t time spacing then RTO Thursday or Friday for BP check.   - Reinforced warning s/s of Pre E and how/why to contact CNM prn.   - Charge RN and King NATASHA updated.       AHMET Avalos CNM    ADDENDUM - noted in prenatal records from previous provider under media that pt was diagnosed with Chronic HTN before 20 weeks of pregnancy and was on Labetalol.  Consider IOL at 39 weeks for CHTN if no pre eclampsia today.  Ya LEO CNM

## 2018-09-24 NOTE — MR AVS SNAPSHOT
After Visit Summary   9/24/2018    Libby Mahajan    MRN: [id number]           Patient Information     Date Of Birth          1990        Visit Information        Provider Department      9/24/2018 11:00 AM Ya Figueroa APRN CNM [facility]        Today's Diagnoses     Supervision of high-risk pregnancy, third trimester    -  1    Large for dates- FH > dates        Pregnancy with prenatal care elsewhere in third trimester        Hypertension during pregnancy in third trimester, unspecified hypertension in pregnancy type        Anemia during pregnancy in third trimester           Follow-ups after your visit        Follow-up notes from your care team     Discussed this visit Return for to L&D for BP check. RTO Fri for BP check in undelievered.      Who to contact     Please call your clinic at [phone] to:    Ask questions about your health    Make or cancel appointments    Discuss your medicines    Learn about your test results    Speak to your doctor            Additional Information About Your Visit        Care EveryWhere ID     This is your Care EveryWhere ID. This could be used by other organizations to access your [facility] medical records  [id number]        Your Vitals Were     Pulse Last Period BMI (Body Mass Index)             91 12/27/2017 31.78 kg/m2          Blood Pressure from Last 3 Encounters:   09/24/18 149/80   09/17/18 129/79   09/10/18 126/78    Weight from Last 3 Encounters:   09/24/18 86.6 kg (191 lb)   09/17/18 88.6 kg (195 lb 4.8 oz)   09/10/18 88.2 kg (194 lb 8 oz)              Today, you had the following     No orders found for display       Primary Care Provider    None Specified       No primary provider on file.        Equal Access to Services     [facility]: Hadii vipul Leos, wayaseminda lurohitadaha, qaybta kaalmada subha, ladi good. So [facility] [phone].    ATENCIÓN: pete Felix  a bhat disposición servicios gratuitos de asistencia lingüística. Raj mclaughlin [phone].    We comply with applicable federal civil rights laws and [place] laws. We do not discriminate on the basis of race, color, national origin, age, disability, sex, sexual orientation, or gender identity.            Thank you!     Thank you for choosing [facility]  for your care. Our goal is always to provide you with excellent care. Hearing back from our patients is one way we can continue to improve our services. Please take a few minutes to complete the written survey that you may receive in the mail after your visit with us. Thank you!             Your Updated Medication List - Protect others around you: Learn how to safely use, store and throw away your medicines at www.disposemymeds.org.          This list is accurate as of 9/24/18 11:56 AM.  Always use your most recent med list.                   Brand Name Dispense Instructions for use Diagnosis    calcium carbonate 500 MG chewable tablet    TUMS     Take 1 chew tab by mouth 2 times daily

## 2018-09-24 NOTE — PLAN OF CARE
Libby Mahajan presents for pre-eclampsia evaluation Patient denies headache, visual disturbances, epigastric pain, edema. Trace edema bilateral on feet noted. Reflexes 2+. Clonus negative    BP Readings from Last 3 Encounters:   09/24/18 130/85   09/24/18 149/80   09/17/18 129/79   .    Past Medical History:   Diagnosis Date     Gallstone     2018 in pregnancy       NST:   Baseline Rate: 140  Accelerations: Present  Decelerations: None  Interpretation: reactive  .  Uterine Assessment: frequency q 2-12 with irritability, patient reports cramping. contraction pattern stable and within parameters.    Dhruv King notified of arrival and condition.  Oriented patient to surroundings, call light within reach.        Plan:  -labs  -fetal monitoring  -serial blood pressures  -discharge home with follow up NST and clinic appointment on Wednesday 9/26.   -IOL scheduled for 9/28 0900    Patient verbalized understanding of discharge instructions.     Requesting information on a long term birth control- wants information on her Wednesday clinic visit for IUD options.

## 2018-09-24 NOTE — PROGRESS NOTES
HOSPITAL TRIAGE NOTE  ===================    CHIEF COMPLAINT  ========================  Libby Mahajan is a 28 year old patient presenting today at 38w5d for evaluation of hypertension disorder of pregnancy.    Patient's last menstrual period was 2017.  Estimated Date of Delivery: Oct 3, 2018     HPI  ==================   Sent from clinic for BP evaluation and r/o pre-eclampsia. Denies headaches, changes in vision, RUQ/epigastric pain. Has been having contractions off/on. Denies leaking of fluid. + fetal movement.  Late SARAH from [place]. Prenatal record and labs reviewed from [facility].  Had previously elevated BP in early pregnancy (137/95 on 3/9/18 and 132/93 on 3/21/18) and was put on labetalol. Medication was discontinued prior to SARAH.  Pt also reports elevated BP in postpartum period with last pregnancy.    CONTRACTIONS: irreg and mild  ABDOMINAL PAIN: none  FETAL MOVEMENT: active    VAGINAL BLEEDING: none  RUPTURE OF MEMBRANES: no  PELVIC PAIN: none    PREGNANCY COMPLICATIONS: Hypertensive disorders of pregnancy  OTHER: none    REVIEW OF SYSTEMS  =====================  C: NEGATIVE for fever, chills  I: NEGATIVE for worrisome rashes, moles or lesions  E: NEGATIVE for vision changes or irritation  R: NEGATIVE for significant cough or SOB  CV: NEGATIVE for chest pain, palpitations or varicosities  GI: NEGATIVE for nausea, abdominal pain, heartburn, or change in bowel habits  : NEGATIVE for frequency, dysuria, or hematuria  M: NEGATIVE for significant arthralgias or myalgia  N: NEGATIVE for headache, weakness, dizziness or paresthesias  P: NEGATIVE for changes in mood or affect    PROBLEM LIST  ===============  Patient Active Problem List    Diagnosis Date Noted     Hypertension during pregnancy in third trimester, unspecified hypertension in pregnancy type 2018     Priority: Medium     Pregnancy with prenatal care elsewhere in third trimester 2018     Priority: Medium      Anemia during pregnancy in third trimester 09/24/2018     Priority: Medium     Labor and delivery, indication for care 09/24/2018     Priority: Medium     Supervision of high-risk pregnancy, third trimester 09/16/2018     Priority: Medium     9/10/18: SARAH @ 36+5 from [place]  Has NO records- RN has attempted multiple times and is unable to obtain records  - GBS done = ___  - Pre-e labs ordered d/t pt report of elevated BP on anti-HTN medication at beginning of pregnancy and elevated BP at end of previous pregnancy?  - GC/CT done  - Abo/Rh T&S done  - Growth us with next visit d/t FH > dates    - Consider remaining prenatal labs at next visit? Future orders in place.       Large for dates- FH > dates 09/10/2018     Priority: Medium     9/10/18: Fundal height > dates- growth us ordered       HISTORIES  ==============  ALLERGIES:    No Known Allergies  PAST MEDICAL HISTORY  Past Medical History:   Diagnosis Date     Gallstone     2018 in pregnancy     SOCIAL HISTORY  Social History     Social History     Marital status:      Spouse name: Jurgen     Number of children: N/A     Years of education: N/A     Occupational History     Not on file.     Social History Main Topics     Smoking status: Never Smoker     Smokeless tobacco: Never Used     Alcohol use Yes      Comment: rare before stopped in pregnancy     Drug use: No     Sexual activity: Yes     Partners: Male     Other Topics Concern     Not on file     Social History Narrative    How much exercise per week? No much    How much calcium per day? In foods  In dairy       How much caffeine per day? 0    How much vitamin D per day? In foods    Do you/your family wear seatbelts?  Yes    Do you/your family use safety helmets? Yes    Do you/your family use sunscreen? Yes    Do you/your family keep firearms in the home? No    Do you/your family have a smoke detector(s)? Yes            Reviewed cmckim lpn 9-             FAMILY HISTORY  Family History    Problem Relation Age of Onset     Hypertension Mother      Asthma Mother      OB HISTORY  Obstetric History       T2      L2     SAB0   TAB0   Ectopic0   Multiple0   Live Births0       # Outcome Date GA Lbr Eile/2nd Weight Sex Delivery Anes PTL Lv   3 Current            2 Term 16 39w2d  3.969 kg (8 lb 12 oz) M          Name: Jurgen Sales Term 09 39w0d  3.09 kg (6 lb 13 oz) F          Name: Kelly        Prenatal Labs:   Lab Results   Component Value Date    ABO O 09/10/2018    RH Pos 09/10/2018    AS Neg 09/10/2018    HGB 10.1 (L) 09/10/2018     Rubella- immune  ULTRASOUND(s) reviewed: LGA, ANU wnl    EXAM  ============  LMP 2017  GENERAL APPEARANCE: healthy, alert and no distress  RESP: normal respiratory effort  CV: regular rates and rhythm, normal S1 S2, no S3 or S4 and no murmur,and no varicosities  ABDOMEN:  soft, nontender, no epigastric pain  SKIN: no suspicious lesions or rashes  NEURO: Denies headache, blurred vision, other vision changes  PSYCH: mentation appears normal. and affect normal/bright  MS/ LEGS: Reflexes normal bilaterally and trace edema    CONTRACTIONS: irreg and mild   FETAL HEART TONES: continuous EFM- baseline 135 with moderate variability and positive accelerations. No decelerations.  NST: REACTIVE  EFW:8.5 lbs    PELVIC EXAM: deferred  PORTER SCORE: n/a  PRESENTATION: VERTEX  BLOOD: no  DISCHARGE: none    ROM: no  ROMPLUS: not done    LABS: Pre-E labs- ALT, AST, creatinine, uric acid, CBC with platelets, Protein/Creatinine Ratio  Lab results reviewed- Hgb 9.4, otherwise wnl    DIAGNOSIS  ============  38w5d seen on the [facility] T[facility] for hypertension evaluation  NST: REACTIVE  Fetal Heart rate tracing:category one  CHTN    PLAN  ============  Discharge to home with labor instuctions per discharge instruction form.  Call or return to the [facility] with contractions, cramping, abdominal or pelvic pain, vaginal bleeding, leaking fluid or  decreased fetal movement.  Follow up at your next clinic visit- to be scheduled for Wed/Thurs with BPP  Pt partner's arrives in town on Friday, discussed IOL on Wednesday vs. Friday.  Pt agreeable to Friday IOL, if BPP reassuring on Wed/Thurs. IOL scheduled for 9/28/ at 0900 d/t CHTN.    AHMET Byrnes CNM     Fetal Non-Stress Test Results    NST Ordered By: AHMET Byrnes CNM  NST Medical Indication: pre-eclampsia    NST Start & Stop Times  NST Start Time: 1225  NST Stop Time: 1245    NST Results  Fetus A   Baseline Rate: 140  Accelerations: Present  Decelerations: None  Interpretation: reactive

## 2018-09-25 ENCOUNTER — NURSE TRIAGE (OUTPATIENT)
Dept: NURSING | Facility: CLINIC | Age: 28
End: 2018-09-25

## 2018-09-25 ENCOUNTER — HOSPITAL ENCOUNTER (INPATIENT)
Facility: CLINIC | Age: 28
LOS: 1 days | Discharge: HOME HEALTH | End: 2018-09-26
Attending: ADVANCED PRACTICE MIDWIFE | Admitting: ADVANCED PRACTICE MIDWIFE
Payer: MEDICAID

## 2018-09-25 PROBLEM — Z36.89 ENCOUNTER FOR TRIAGE IN PREGNANT PATIENT: Status: ACTIVE | Noted: 2018-09-25

## 2018-09-25 LAB
ABO + RH BLD: NORMAL
ABO + RH BLD: NORMAL
BASOPHILS # BLD AUTO: 0 10E9/L (ref 0–0.2)
BASOPHILS NFR BLD AUTO: 0.1 %
BLD GP AB SCN SERPL QL: NORMAL
BLOOD BANK CMNT PATIENT-IMP: NORMAL
DIFFERENTIAL METHOD BLD: ABNORMAL
EOSINOPHIL # BLD AUTO: 0 10E9/L (ref 0–0.7)
EOSINOPHIL NFR BLD AUTO: 0.1 %
ERYTHROCYTE [DISTWIDTH] IN BLOOD BY AUTOMATED COUNT: 14.5 % (ref 10–15)
HCT VFR BLD AUTO: 32.8 % (ref 35–47)
HGB BLD-MCNC: 8.9 G/DL (ref 11.7–15.7)
HGB BLD-MCNC: 9.9 G/DL (ref 11.7–15.7)
IMM GRANULOCYTES # BLD: 0 10E9/L (ref 0–0.4)
IMM GRANULOCYTES NFR BLD: 0.3 %
LYMPHOCYTES # BLD AUTO: 1.9 10E9/L (ref 0.8–5.3)
LYMPHOCYTES NFR BLD AUTO: 17.5 %
MCH RBC QN AUTO: 22.2 PG (ref 26.5–33)
MCHC RBC AUTO-ENTMCNC: 30.2 G/DL (ref 31.5–36.5)
MCV RBC AUTO: 74 FL (ref 78–100)
MONOCYTES # BLD AUTO: 0.8 10E9/L (ref 0–1.3)
MONOCYTES NFR BLD AUTO: 7.1 %
NEUTROPHILS # BLD AUTO: 7.9 10E9/L (ref 1.6–8.3)
NEUTROPHILS NFR BLD AUTO: 74.9 %
NRBC # BLD AUTO: 0 10*3/UL
NRBC BLD AUTO-RTO: 0 /100
PLATELET # BLD AUTO: 240 10E9/L (ref 150–450)
RBC # BLD AUTO: 4.45 10E12/L (ref 3.8–5.2)
SPECIMEN EXP DATE BLD: NORMAL
WBC # BLD AUTO: 10.6 10E9/L (ref 4–11)

## 2018-09-25 PROCEDURE — 86850 RBC ANTIBODY SCREEN: CPT | Performed by: ADVANCED PRACTICE MIDWIFE

## 2018-09-25 PROCEDURE — G0463 HOSPITAL OUTPT CLINIC VISIT: HCPCS

## 2018-09-25 PROCEDURE — 85025 COMPLETE CBC W/AUTO DIFF WBC: CPT | Performed by: ADVANCED PRACTICE MIDWIFE

## 2018-09-25 PROCEDURE — 85018 HEMOGLOBIN: CPT | Performed by: ADVANCED PRACTICE MIDWIFE

## 2018-09-25 PROCEDURE — 86780 TREPONEMA PALLIDUM: CPT | Performed by: ADVANCED PRACTICE MIDWIFE

## 2018-09-25 PROCEDURE — 25000125 ZZHC RX 250: Performed by: ADVANCED PRACTICE MIDWIFE

## 2018-09-25 PROCEDURE — 86900 BLOOD TYPING SEROLOGIC ABO: CPT | Performed by: ADVANCED PRACTICE MIDWIFE

## 2018-09-25 PROCEDURE — 36415 COLL VENOUS BLD VENIPUNCTURE: CPT | Performed by: ADVANCED PRACTICE MIDWIFE

## 2018-09-25 PROCEDURE — 12000030 ZZH R&B OB INTERMEDIATE UMMC

## 2018-09-25 PROCEDURE — 72200001 ZZH LABOR CARE VAGINAL DELIVERY SINGLE

## 2018-09-25 PROCEDURE — 25000132 ZZH RX MED GY IP 250 OP 250 PS 637: Performed by: ADVANCED PRACTICE MIDWIFE

## 2018-09-25 PROCEDURE — 86901 BLOOD TYPING SEROLOGIC RH(D): CPT | Performed by: ADVANCED PRACTICE MIDWIFE

## 2018-09-25 PROCEDURE — 25000128 H RX IP 250 OP 636: Performed by: ADVANCED PRACTICE MIDWIFE

## 2018-09-25 RX ORDER — LIDOCAINE HYDROCHLORIDE 10 MG/ML
INJECTION, SOLUTION INFILTRATION; PERINEURAL
Status: DISCONTINUED
Start: 2018-09-25 | End: 2018-09-25 | Stop reason: WASHOUT

## 2018-09-25 RX ORDER — ACETAMINOPHEN 325 MG/1
650 TABLET ORAL EVERY 4 HOURS PRN
Qty: 100 TABLET | Refills: 0 | Status: SHIPPED | OUTPATIENT
Start: 2018-09-25 | End: 2018-09-26

## 2018-09-25 RX ORDER — LANOLIN 100 %
OINTMENT (GRAM) TOPICAL
Status: DISCONTINUED | OUTPATIENT
Start: 2018-09-25 | End: 2018-09-26 | Stop reason: HOSPADM

## 2018-09-25 RX ORDER — IBUPROFEN 800 MG/1
800 TABLET, FILM COATED ORAL EVERY 6 HOURS PRN
Status: DISCONTINUED | OUTPATIENT
Start: 2018-09-25 | End: 2018-09-26 | Stop reason: HOSPADM

## 2018-09-25 RX ORDER — FENTANYL CITRATE 50 UG/ML
50-100 INJECTION, SOLUTION INTRAMUSCULAR; INTRAVENOUS
Status: DISCONTINUED | OUTPATIENT
Start: 2018-09-25 | End: 2018-09-25

## 2018-09-25 RX ORDER — SODIUM CHLORIDE, SODIUM LACTATE, POTASSIUM CHLORIDE, CALCIUM CHLORIDE 600; 310; 30; 20 MG/100ML; MG/100ML; MG/100ML; MG/100ML
INJECTION, SOLUTION INTRAVENOUS CONTINUOUS
Status: DISCONTINUED | OUTPATIENT
Start: 2018-09-25 | End: 2018-09-25

## 2018-09-25 RX ORDER — OXYTOCIN 10 [USP'U]/ML
10 INJECTION, SOLUTION INTRAMUSCULAR; INTRAVENOUS
Status: DISCONTINUED | OUTPATIENT
Start: 2018-09-25 | End: 2018-09-26 | Stop reason: HOSPADM

## 2018-09-25 RX ORDER — OXYTOCIN 10 [USP'U]/ML
10 INJECTION, SOLUTION INTRAMUSCULAR; INTRAVENOUS
Status: DISCONTINUED | OUTPATIENT
Start: 2018-09-25 | End: 2018-09-25

## 2018-09-25 RX ORDER — MISOPROSTOL 200 UG/1
TABLET ORAL
Status: DISCONTINUED
Start: 2018-09-25 | End: 2018-09-25 | Stop reason: HOSPADM

## 2018-09-25 RX ORDER — NALOXONE HYDROCHLORIDE 0.4 MG/ML
.1-.4 INJECTION, SOLUTION INTRAMUSCULAR; INTRAVENOUS; SUBCUTANEOUS
Status: DISCONTINUED | OUTPATIENT
Start: 2018-09-25 | End: 2018-09-26 | Stop reason: HOSPADM

## 2018-09-25 RX ORDER — OXYTOCIN/0.9 % SODIUM CHLORIDE 30/500 ML
340 PLASTIC BAG, INJECTION (ML) INTRAVENOUS CONTINUOUS PRN
Status: DISCONTINUED | OUTPATIENT
Start: 2018-09-25 | End: 2018-09-26 | Stop reason: HOSPADM

## 2018-09-25 RX ORDER — NALOXONE HYDROCHLORIDE 0.4 MG/ML
.1-.4 INJECTION, SOLUTION INTRAMUSCULAR; INTRAVENOUS; SUBCUTANEOUS
Status: DISCONTINUED | OUTPATIENT
Start: 2018-09-25 | End: 2018-09-25

## 2018-09-25 RX ORDER — METHYLERGONOVINE MALEATE 0.2 MG/ML
200 INJECTION INTRAVENOUS
Status: DISCONTINUED | OUTPATIENT
Start: 2018-09-25 | End: 2018-09-25

## 2018-09-25 RX ORDER — ACETAMINOPHEN 325 MG/1
650 TABLET ORAL EVERY 4 HOURS PRN
Status: DISCONTINUED | OUTPATIENT
Start: 2018-09-25 | End: 2018-09-26 | Stop reason: HOSPADM

## 2018-09-25 RX ORDER — BISACODYL 10 MG
10 SUPPOSITORY, RECTAL RECTAL DAILY PRN
Status: DISCONTINUED | OUTPATIENT
Start: 2018-09-27 | End: 2018-09-26 | Stop reason: HOSPADM

## 2018-09-25 RX ORDER — OXYCODONE AND ACETAMINOPHEN 5; 325 MG/1; MG/1
1 TABLET ORAL
Status: DISCONTINUED | OUTPATIENT
Start: 2018-09-25 | End: 2018-09-25

## 2018-09-25 RX ORDER — NALBUPHINE HYDROCHLORIDE 10 MG/ML
2.5-5 INJECTION, SOLUTION INTRAMUSCULAR; INTRAVENOUS; SUBCUTANEOUS EVERY 6 HOURS PRN
Status: DISCONTINUED | OUTPATIENT
Start: 2018-09-25 | End: 2018-09-26 | Stop reason: HOSPADM

## 2018-09-25 RX ORDER — IBUPROFEN 800 MG/1
800 TABLET, FILM COATED ORAL EVERY 6 HOURS PRN
Qty: 90 TABLET | Refills: 0 | Status: SHIPPED | OUTPATIENT
Start: 2018-09-25 | End: 2018-11-07

## 2018-09-25 RX ORDER — EPHEDRINE SULFATE 50 MG/ML
5 INJECTION, SOLUTION INTRAMUSCULAR; INTRAVENOUS; SUBCUTANEOUS
Status: DISCONTINUED | OUTPATIENT
Start: 2018-09-25 | End: 2018-09-26 | Stop reason: HOSPADM

## 2018-09-25 RX ORDER — OXYTOCIN 10 [USP'U]/ML
INJECTION, SOLUTION INTRAMUSCULAR; INTRAVENOUS
Status: DISCONTINUED
Start: 2018-09-25 | End: 2018-09-25 | Stop reason: WASHOUT

## 2018-09-25 RX ORDER — CARBOPROST TROMETHAMINE 250 UG/ML
250 INJECTION, SOLUTION INTRAMUSCULAR
Status: DISCONTINUED | OUTPATIENT
Start: 2018-09-25 | End: 2018-09-25

## 2018-09-25 RX ORDER — OXYTOCIN/0.9 % SODIUM CHLORIDE 30/500 ML
100 PLASTIC BAG, INJECTION (ML) INTRAVENOUS CONTINUOUS
Status: DISCONTINUED | OUTPATIENT
Start: 2018-09-25 | End: 2018-09-26 | Stop reason: HOSPADM

## 2018-09-25 RX ORDER — HYDROCORTISONE 2.5 %
CREAM (GRAM) TOPICAL 3 TIMES DAILY PRN
Status: DISCONTINUED | OUTPATIENT
Start: 2018-09-25 | End: 2018-09-26 | Stop reason: HOSPADM

## 2018-09-25 RX ORDER — ACETAMINOPHEN 325 MG/1
650 TABLET ORAL EVERY 4 HOURS PRN
Status: DISCONTINUED | OUTPATIENT
Start: 2018-09-25 | End: 2018-09-25

## 2018-09-25 RX ORDER — AMOXICILLIN 250 MG
1 CAPSULE ORAL 2 TIMES DAILY
Status: DISCONTINUED | OUTPATIENT
Start: 2018-09-25 | End: 2018-09-26 | Stop reason: HOSPADM

## 2018-09-25 RX ORDER — IBUPROFEN 800 MG/1
800 TABLET, FILM COATED ORAL
Status: COMPLETED | OUTPATIENT
Start: 2018-09-25 | End: 2018-09-25

## 2018-09-25 RX ORDER — AMOXICILLIN 250 MG
2 CAPSULE ORAL 2 TIMES DAILY
Status: DISCONTINUED | OUTPATIENT
Start: 2018-09-25 | End: 2018-09-26 | Stop reason: HOSPADM

## 2018-09-25 RX ORDER — ONDANSETRON 2 MG/ML
4 INJECTION INTRAMUSCULAR; INTRAVENOUS EVERY 6 HOURS PRN
Status: DISCONTINUED | OUTPATIENT
Start: 2018-09-25 | End: 2018-09-25

## 2018-09-25 RX ORDER — OXYTOCIN/0.9 % SODIUM CHLORIDE 30/500 ML
PLASTIC BAG, INJECTION (ML) INTRAVENOUS
Status: DISCONTINUED
Start: 2018-09-25 | End: 2018-09-25 | Stop reason: HOSPADM

## 2018-09-25 RX ORDER — OXYTOCIN/0.9 % SODIUM CHLORIDE 30/500 ML
100-340 PLASTIC BAG, INJECTION (ML) INTRAVENOUS CONTINUOUS PRN
Status: COMPLETED | OUTPATIENT
Start: 2018-09-25 | End: 2018-09-25

## 2018-09-25 RX ADMIN — ACETAMINOPHEN 650 MG: 325 TABLET, FILM COATED ORAL at 15:13

## 2018-09-25 RX ADMIN — OXYTOCIN-SODIUM CHLORIDE 0.9% IV SOLN 30 UNIT/500ML 340 ML/HR: 30-0.9/5 SOLUTION at 04:07

## 2018-09-25 RX ADMIN — SENNOSIDES AND DOCUSATE SODIUM 2 TABLET: 8.6; 5 TABLET ORAL at 09:40

## 2018-09-25 RX ADMIN — SODIUM CHLORIDE, POTASSIUM CHLORIDE, SODIUM LACTATE AND CALCIUM CHLORIDE 1000 ML: 600; 310; 30; 20 INJECTION, SOLUTION INTRAVENOUS at 03:35

## 2018-09-25 RX ADMIN — IBUPROFEN 800 MG: 800 TABLET ORAL at 19:38

## 2018-09-25 RX ADMIN — IBUPROFEN 800 MG: 800 TABLET ORAL at 11:40

## 2018-09-25 RX ADMIN — SENNOSIDES AND DOCUSATE SODIUM 2 TABLET: 8.6; 5 TABLET ORAL at 19:38

## 2018-09-25 RX ADMIN — ACETAMINOPHEN 650 MG: 325 TABLET, FILM COATED ORAL at 23:33

## 2018-09-25 RX ADMIN — IBUPROFEN 800 MG: 800 TABLET ORAL at 04:23

## 2018-09-25 RX ADMIN — ACETAMINOPHEN 650 MG: 325 TABLET, FILM COATED ORAL at 09:40

## 2018-09-25 RX ADMIN — ACETAMINOPHEN 650 MG: 325 TABLET, FILM COATED ORAL at 19:38

## 2018-09-25 RX ADMIN — ACETAMINOPHEN 650 MG: 325 TABLET, FILM COATED ORAL at 05:50

## 2018-09-25 ASSESSMENT — ACTIVITIES OF DAILY LIVING (ADL)
BATHING: 0-->INDEPENDENT
RETIRED_EATING: 0-->INDEPENDENT
TOILETING: 0-->INDEPENDENT
RETIRED_COMMUNICATION: 0-->UNDERSTANDS/COMMUNICATES WITHOUT DIFFICULTY
SWALLOWING: 0-->SWALLOWS FOODS/LIQUIDS WITHOUT DIFFICULTY
DRESS: 0-->INDEPENDENT
COGNITION: 0 - NO COGNITION ISSUES REPORTED
FALL_HISTORY_WITHIN_LAST_SIX_MONTHS: NO
TRANSFERRING: 0-->INDEPENDENT
AMBULATION: 0-->INDEPENDENT

## 2018-09-25 NOTE — L&D DELIVERY NOTE
DELIVERY NOTE:  Libby Mahajan is a 28-year-old  at 38w6d who presented to labor floor in active labor. She spontaneosly ruptured for clear fluid and rapidly progressed to complete and +3.  Pushed effectively without coaching. FHR with variable decels with pushing effort, moderate variability throughout. Head delivered OA and restituted to BIN . Loose nuchal cord x 1 reduced. Shoulders easily delivered under maternal effort.  Live female  delivered at 4:03 over an intact perineum under no anesthesia.  Spontaneous breath, vigorous cry, well flexed, HR>100. Infant directly to maternal abdomen, skin to skin. Delayed cord clamping for 5 minutes then clamped x2 and cut by pt's cousin/support person.   20 units of pitocin infusing after baby.  Cord blood obtained for typing.  Intact placenta spontaneously delivered via alvarado. 3 vessel cord. Fundus firm @ 2 FB below umbilicus after vigorous fundal massage.  Vagina, perineum, and rectum inspected, found to be intact. Mother and infant stable; continued skin to skin. Good family bonding observed.     Apgars 8/9.  Weight pending.  QBL pending    Delivery Note:   IUP at 38 weeks 6 days gestation delivered on 2018.     delivery of a viable Female infant.  Weight : pending  Apgars of 8 at 1 minute and 9 at 5 minutes.  Labor was spontaneous.  Medications administered  in labor:  Pain Rx Nitrous Oxide; Antibiotics No  Perineum: Intact  Placenta-mechanism: spontaneous, intact,  with a 3 vessel cord.  Quantitative Blood Loss was pending  Complications of labor and delivery: Macrosomia  Anticipated Discharge Date: 2018  Birth attendants: AHMET Bishop CNM, APRN CNM     Delivery Summary - No Cardenas  Delivery Summary    Libby Mahajan MRN# [id number]   Age: 28 year old YOB: 1990     Labor Event Times:    Labor Onset Date       Labor Onset Time    Dilation Complete Date    Dilation Complete Time        Start Pushing Date        Start Pushing Time            Labor Length:    1st Stage (hrs/min)     2nd Stage (hrs/min)     3rd Stage (hrs/min)         Labor Events:     Labor    Rupture Date     Rupture Time     Rupture Type Spontaneous rupture of membranes occuring during spontaneous labor or augmentation   Fluid Color     Labor Type     Induction    Induction Indication         Augmentation    Labor Complications     Additional Complications     Management of Labor        Antibiotics     IV Antibiotic Given     Additional Management     Fetal Status Prior to  Delivery     Fetal Status Comments         Cervical Ripening:    Date     Time     Type         Delivery:    Episiotomy None   Local Anesthetic        Lacerations    Sponge Count Correct       Needle Count Correct     Final Count by:    Sutures     Blood loss (ml)    Packing Intentionally Left In     Number     Comments           Information for the patient's :  Marcos Mahajan [[id number]]       Delivery  2018 4:03 AM by  Vaginal, Spontaneous Delivery  Sex:  female Gestational Age: 38w6d  Delivery Clinician:     Living?:            APGARS  One minute Five minutes Ten minutes   Skin color:            Heart rate:            Grimace:            Muscle tone:            Breathing:            Totals:              Presentation/position:           Resuscitation and Interventions: Method:     Oxygen Type:     Intubation Time:   # of Attempts:     ETT Size:        Tracheal Suction:     Tracheal returns:      [place]annock Care at Delivery:           Cord information:     Disposition of cord blood:      Blood gases sent?    Complications:     Placenta: Delivered:           appearance.  Comments:  .  Disposition: Hospital disposal  Shannock Measurements:  Weight:    Height:    Head circumference:    Chest circumference:     Temperature:     Other providers:       Additional  information:  Forceps:    Verbal Informed Consent Obtained:        Alternative Labor Strategies Discussed:     Emergency Resources Available:       Type:       Accrued Pulling Time:       # of Pulls:      Position:     Fetal Station:       Indications:      Other Indications:     Operative Vaginal Delivery Brief Note Forceps:        Vacuum:    Verbal Informed Consent Obtained:     Alternative Labor Strategies Discussed:     Emergency Resources Available:     Type:      Accrued Pulling Time:       # of Pop-Offs:       # of Pulls:       Position:     Fetal Station:      Indications for Vacuum:       Other Indications:    Operative Vaginal Delivery Brief Note Vacuum:        Shoulder Dystocia Shoulder Dystocia    Fetal Tracing Prior to Delivery:  Category 2   Shoulder dystocia present?:  No                                            Breech:       : Type:     Indications for Primary:     Indications for Secondary:     Other Indications:        Observed anomalies     Output in Delivery Room:

## 2018-09-25 NOTE — PROGRESS NOTES
Post Partum Note    Libby Mahajan      MRN#: [id number]  Age: 28 year old      YOB: 1990      Post-partum day #Day Of Delivery     SIGNIFICANT PROBLEMS:  Patient Active Problem List    Diagnosis Date Noted     Encounter for triage in pregnant patient 2018     Priority: Medium     Labor and delivery indication for care or intervention 2018     Priority: Medium      (normal spontaneous vaginal delivery) 2018     Priority: Medium     Hypertension during pregnancy in third trimester, unspecified hypertension in pregnancy type 2018     Priority: Medium     Pregnancy with prenatal care elsewhere in third trimester 2018     Priority: Medium     Anemia during pregnancy in third trimester 2018     Priority: Medium     Labor and delivery, indication for care 2018     Priority: Medium     Supervision of high-risk pregnancy, third trimester 2018     Priority: Medium     9/10/18: SARAH @ 36+5 from [place]  Has NO records- RN has attempted multiple times and is unable to obtain records  - GBS done = ___  - Pre-e labs ordered d/t pt report of elevated BP on anti-HTN medication at beginning of pregnancy and elevated BP at end of previous pregnancy?  - GC/CT done  - Abo/Rh T&S done  - Growth us with next visit d/t FH > dates    - Consider remaining prenatal labs at next visit? Future orders in place.       Large for dates- FH > dates 09/10/2018     Priority: Medium     9/10/18: Fundal height > dates- growth us ordered         INTERVAL HISTORY:  /84  Pulse 75  Temp 99.6  F (37.6  C) (Oral)  Resp 18  LMP 2017  Breastfeeding? Unknown    Pt stable, baby is rooming in  Breast feeding status:initiated  Complications since 2 hours post delivery: None  Patient is tolerating acitivity well Voiding without difficulty, cramping is minimal and is relieved by Ibuprophen, lochia is decreasing and patient denies clots.  Perineal pain is is minimal and  is relieved by Ibuprophen.  The perineum is intact    Normal postpartum exam day of delivery     Postpartum hemoglobin tomorrow   Hemoglobin   Date Value Ref Range Status   09/25/2018 9.9 (L) 11.7 - 15.7 g/dL Final     Blood type   Lab Results   Component Value Date    ABO O 09/25/2018       Lab Results   Component Value Date    RH Pos 09/25/2018     Rubella status   Lab Results   Component Value Date    RUBELLAABIGG Immune 02/08/2018       ASSESSMENT/PLAN:  Stable Post-partum day #day of delivery   Complications:none  Plan d/c home tomorrow  RTC 6 weeks  Teaching done: Birth Control Options, RTC Clinic for PP Appointment and PNV    Postpartum warning s/s reviewed, including bleeding/clots, fever, mastitis, or depression    Birthcontrol planned:None  Current Discharge Medication List      CONTINUE these medications which have NOT CHANGED    Details   calcium carbonate (TUMS) 500 MG chewable tablet Take 1 chew tab by mouth 2 times daily      doxylamine (UNISOM) 25 MG TABS tablet Take 1 tablet (25 mg) by mouth At Bedtime  Qty: 30 each, Refills: 1    Associated Diagnoses: Supervision of high-risk pregnancy, third trimester      Acetaminophen (TYLENOL PO) Take 650 mg by mouth as needed for mild pain or fever      ferrous sulfate (IRON) 325 (65 Fe) MG tablet Take 1 tablet (325 mg) by mouth daily (with breakfast)  Qty: 30 tablet, Refills: 2    Associated Diagnoses: Anemia during pregnancy in third trimester             Cinthya Jiang CNM APRN

## 2018-09-25 NOTE — IP AVS SNAPSHOT
MRN:[id number]                      After Visit Summary   9/25/2018    Libby Mahajan    MRN: [id number]           Thank you!     Thank you for choosing [facility] for your care. Our goal is always to provide you with excellent care. Hearing back from our patients is one way we can continue to improve our services. Please take a few minutes to complete the written survey that you may receive in the mail after you visit with us. Thank you!        Patient Information     Date Of Birth          1990        Designated Caregiver       Most Recent Value    Caregiver    Will someone help with your care after discharge? no      About your hospital stay     You were admitted on:  September 25, 2018 You last received care in the:  [facility]    You were discharged on:  September 26, 2018       Who to Call     For medical emergencies, please call 911.  For non-urgent questions about your medical care, please call your primary care provider or clinic, None          Attending Provider     Provider Specialty    Daisha Bonds APRN [facility]       Primary Care Provider Fax #    Physician No Ref-Primary [phone]      Further instructions from your care team       Postpartum Vaginal Delivery Instructions    Activity       Ask family and friends for help when you need it.    Do not place anything in your vagina for 6 weeks.    You are not restricted on other activities, but take it easy for a few weeks to allow your body to recover from delivery.  You are able to do any activities you feel up to that point.    No driving until you have stopped taking your pain medications (usually two weeks after delivery).     Call your health care provider if you have any of these symptoms:       Increased pain, swelling, redness, or fluid around your stiches from an episiotomy or perineal tear.    A fever above 100.4 F (38 C) with or without chills when placing a thermometer under your tongue.    You soak a sanitary  pad with blood within 1 hour, or you see blood clots larger than a golf ball.    Bleeding that lasts more than 6 weeks.    Vaginal discharge that smells bad.    Severe pain, cramping or tenderness in your lower belly area.    A need to urinate more frequently (use the toilet more often), more urgently (use the toilet very quickly), or it burns when you urinate.    Nausea and vomiting.    Redness, swelling or pain around a vein in your leg.    Problems breastfeeding or a red or painful area on your breast.    Chest pain and cough or are gasping for air.    Problems coping with sadness, anxiety, or depression.  If you have any concerns about hurting yourself or the baby, call your provider immediately.     You have questions or concerns after you return home.     Keep your hands clean:  Always wash your hands before touching your perineal area and stitches.  This helps reduce your risk of infection.  If your hands aren't dirty, you may use an alcohol hand-rub to clean your hands. Keep your nails clean and short.        Pending Results     Date and Time Order Name Status Description    9/25/2018 0207 Treponema Abs w Reflex to RPR and Titer In process             Admission Information     Date & Time Provider Department Dept. Phone    9/25/2018 Daisha Bonds APRN CNM [facility] [phone]      Your Vitals Were     Blood Pressure Pulse Temperature Respirations Last Period       134/82 91 98.2  F (36.8  C) (Oral) 18 12/27/2017       Care EveryWhere ID     This is your Care EveryWhere ID. This could be used by other organizations to access your [place] medical records  [id number]        Equal Access to Services     [facility] HENRY : Hadii vipul casillaso Solucyali, waaxda luqadaha, qaybta kaalmada adeladi buck. So [facility] [phone].    ATENCIÓN: Si habla español, tiene a bhat disposición servicios gratuitos de asistencia lingüística. Llame al [phone].    We comply with  applicable federal civil rights laws and [place] laws. We do not discriminate on the basis of race, color, national origin, age, disability, sex, sexual orientation, or gender identity.               Review of your medicines      START taking        Dose / Directions    ferrous fumarate 65 mg ([place]. FE)-Vitamin C 125 mg  MG Tabs tablet   Commonly known as:  VITRON C   Used for:  Postpartum anemia        Dose:  1 tablet   Start taking on:  9/27/2018   Take 1 tablet by mouth daily   Quantity:  60 tablet   Refills:  3       ibuprofen 800 MG tablet   Commonly known as:  ADVIL/MOTRIN        Dose:  800 mg   Take 1 tablet (800 mg) by mouth every 6 hours as needed for other (cramping)   Quantity:  90 tablet   Refills:  0         CONTINUE these medicines which may have CHANGED, or have new prescriptions. If we are uncertain of the size of tablets/capsules you have at home, strength may be listed as something that might have changed.        Dose / Directions    acetaminophen 325 MG tablet   Commonly known as:  TYLENOL   This may have changed:    - medication strength  - when to take this        Dose:  650 mg   Take 2 tablets (650 mg) by mouth every 4 hours as needed for mild pain or fever   Quantity:  100 tablet   Refills:  0         CONTINUE these medicines which have NOT CHANGED        Dose / Directions    calcium carbonate 500 MG chewable tablet   Commonly known as:  TUMS        Dose:  1 chew tab   Take 1 chew tab by mouth 2 times daily   Refills:  0         STOP taking     doxylamine 25 MG Tabs tablet   Commonly known as:  UNISOM           ferrous sulfate 325 (65 Fe) MG tablet   Commonly known as:  IRON                Where to get your medicines      These medications were sent to [facility] [place], [place] - [address]  [address] [address]     Phone:  [phone]     acetaminophen 325 MG tablet    ferrous fumarate 65 mg ([place]. FE)-Vitamin C 125 mg  MG Tabs  tablet    ibuprofen 800 MG tablet                Protect others around you: Learn how to safely use, store and throw away your medicines at www.disposemymeds.org.             Medication List: This is a list of all your medications and when to take them. Check marks below indicate your daily home schedule. Keep this list as a reference.      Medications           Morning Afternoon Evening Bedtime As Needed    acetaminophen 325 MG tablet   Commonly known as:  TYLENOL   Take 2 tablets (650 mg) by mouth every 4 hours as needed for mild pain or fever   Last time this was given:  650 mg on 9/26/2018 10:13 AM                                calcium carbonate 500 MG chewable tablet   Commonly known as:  TUMS   Take 1 chew tab by mouth 2 times daily                                ferrous fumarate 65 mg ([place]. FE)-Vitamin C 125 mg  MG Tabs tablet   Commonly known as:  VITRON C   Take 1 tablet by mouth daily   Start taking on:  9/27/2018   Last time this was given:  1 tablet on 9/26/2018  9:13 AM                                ibuprofen 800 MG tablet   Commonly known as:  ADVIL/MOTRIN   Take 1 tablet (800 mg) by mouth every 6 hours as needed for other (cramping)   Last time this was given:  800 mg on 9/26/2018 11:46 AM

## 2018-09-25 NOTE — IP AVS SNAPSHOT
[facility]    [address] [address]    Phone:  [phone]                                       After Visit Summary   9/25/2018    Libby Mahajan    MRN: [id number]           After Visit Summary Signature Page     I have received my discharge instructions, and my questions have been answered. I have discussed any challenges I see with this plan with the nurse or doctor.    ..........................................................................................................................................  Patient/Patient Representative Signature      ..........................................................................................................................................  Patient Representative Print Name and Relationship to Patient    ..................................................               ................................................  Date                                   Time    ..........................................................................................................................................  Reviewed by Signature/Title    ...................................................              ..............................................  Date                                               Time          22EPIC Rev 08/18

## 2018-09-25 NOTE — TELEPHONE ENCOUNTER
"  Reason for Disposition    [1] History of prior delivery (multipara) AND [2] contractions < 10 minutes apart AND [3] present 1 hour    Additional Information    Negative: Passed out (i.e., lost consciousness, collapsed and was not responding)    Negative: Shock suspected (e.g., cold/pale/clammy skin, too weak to stand, low BP, rapid pulse)    Negative: Difficult to awaken or acting confused  (e.g., disoriented, slurred speech)    Negative: [1] SEVERE abdominal pain (e.g., excruciating) AND [2] constant AND [3] present > 1 hour    Negative: Severe bleeding (e.g., continuous red blood from vagina, or large blood clots)    Negative: Umbilical cord hanging out of the vagina (shiny, white, curled appearance, \"like telephone cord\")    Negative: Uncontrollable urge to push (i.e., feels like baby is coming out now)    Negative: Can see baby    Negative: Sounds like a life-threatening emergency to the triager    Negative: Pregnant < 37 weeks (i.e., )    Negative: [1] Uncertain delivery date AND [2] possibly pregnant < 37 weeks (i.e., )    Negative: [1] First baby (primipara) AND [2] contractions < 6 minutes apart  AND [3] present 2 hours    Protocols used: PREGNANCY - LABOR-ADULT-  Caller is pregnant and MARIA DEL CARMEN is 10/03/18. Caller is having contractions that are less than 5 minutes apart greater than 1 hour. Triage guidelines recommend to go to labor and delivery. Triager called out to answering service, left message for on call provider Miko to call patient at [phone].  "

## 2018-09-25 NOTE — PLAN OF CARE
Data: Libby Mahajan transferred to 7136 via wheelchair at 0625. Baby transferred via parent's arms.  Action: Receiving unit notified of transfer: Yes. Patient and family notified of room change. Report given to day shift RNCheryl at 0715. Belongings sent to receiving unit. Accompanied by Registered Nurse. Oriented patient to surroundings. Call light within reach. ID bands double-checked with CARLOS Alaniz prior to shift change.  Response: Patient tolerated transfer and is stable.

## 2018-09-25 NOTE — PLAN OF CARE
Data: Patient presented to [facility] at 0135.   Reason for maternal/fetal assessment per patient is Contractions  .  Patient is a . Prenatal record reviewed.      Obstetric History       T2      L2     SAB0   TAB0   Ectopic0   Multiple0   Live Births2       # Outcome Date GA Lbr Elie/2nd Weight Sex Delivery Anes PTL Lv   3 Current            2 Term 16 39w2d  3.969 kg (8 lb 12 oz) M    RACHNA      Name: Jurgen Sales Term 09 39w0d  3.09 kg (6 lb 13 oz) F    RACHNA      Name: Kelly      . Medical history:   Past Medical History:   Diagnosis Date     Gallstone     2018 in pregnancy   . Gestational Age 38w6d. VSS. Fetal movement present. Patient denies  vaginal discharge, pelvic pressure, UTI symptoms, GI problems, vaginal bleeding, edema, headache, visual disturbances, epigastric or URQ pain, abdominal pain, rupture of membranes. Support persons Davila present.  Action: Verbal consent for EFM. Triage assessment completed. EFM applied for FHT. Uterine assessment for contractions. Fetal assessment: Presumed adequate fetal oxygenation documented (see flow record).   Response: Daisha Bonds CNM informed of pt. arrival. Cervix /-1. Plan per provider is admit and anticipate . Patient verbalized agreement with plan. Patient transferred to room 479 ambulatory, oriented to room and call light.

## 2018-09-25 NOTE — PLAN OF CARE
Problem: Patient Care Overview  Goal: Plan of Care/Patient Progress Review  Outcome: Improving  Cramping is well taken cared of with motrin and tylenol. Able to nap in between feedings and cares. She is voiding freely and able to empty her bladder well. Her nipples are sore, no breakdown noted, using nipple cream for comfort. Breastfeeding with minimal assist for deeper latch. Independent with self and baby cares. Will continue with plan of care.

## 2018-09-25 NOTE — PLAN OF CARE
Problem: Patient Care Overview  Goal: Plan of Care/Patient Progress Review  Outcome: Improving  Received report from Wendy GONZALEZ at 0715 while patient was resting in her room. She was sleepy during that time but went back 45 minutes later and did unit and room orientation. Complained of cramping and has been using hot pack which she claimed it's helping. Breastfeeding with minimal assist for positioning and deeper latch. Will continue with plan of care.

## 2018-09-25 NOTE — PLAN OF CARE
Problem: Labor (Cervical Ripen, Induct, Augment) (Adult,Obstetrics,Pediatric)  Goal: Signs and Symptoms of Listed Potential Problems Will be Absent, Minimized or Managed (Labor)  Signs and symptoms of listed potential problems will be absent, minimized or managed by discharge/transition of care (reference Labor (Cervical Ripen, Induct, Augment) (Adult,Obstetrics,Pediatric) CPG).   Outcome: No Change  Pt. Arrived in active labor. Declines an epidural. Used nitrous that worked well. Progressed to complete @ 0400. Pushed with great maternal effort over one contractions and delivered a viable female @ 0403. Baby to mothers abdomen for skin to skin, dried and stimulated with lusty cry. Pitocin per protocol. Placenta without complication. Fundus firm, U/U, small flow. No repair done. Stable. Continue pp cares.

## 2018-09-25 NOTE — H&P
ADMIT NOTE  =================  38w6d    Libby Mahajan is a 28 year old female with an Patient's last menstrual period was 2017. and Estimated Date of Delivery: Oct 3, 2018 [facility] 2018 at 2:08 AM with in active labor.     This pregnancy is complicated by:  1. LGA. EFW >4500 g at 37.5 weeks  2. Chronic hypertension, not on medications  3. Anemia    HPI  ================  Pt reports regular contractions began around midnight and have become increasingly intense. Denies LOF and VB.    Contractions- strong  Fetal movement- active  ROM- no   Vaginal bleeding- small bloody show  GBS- negative  FOB- is involved, he lives in the [place] and will not be present for delivery  Other labor support- zeny Henson    Weight gain- 191 - 155 lbs, Total weight gain- 36 lbs  Height- 65  BMI- 26  First prenatal visit at 8 weeks, Total visits- 12    PROBLEM LIST  =================  Patient Active Problem List    Diagnosis Date Noted     Encounter for triage in pregnant patient 2018     Priority: Medium     Labor and delivery indication for care or intervention 2018     Priority: Medium     Hypertension during pregnancy in third trimester, unspecified hypertension in pregnancy type 2018     Priority: Medium     Pregnancy with prenatal care elsewhere in third trimester 2018     Priority: Medium     Anemia during pregnancy in third trimester 2018     Priority: Medium     Labor and delivery, indication for care 2018     Priority: Medium     Supervision of high-risk pregnancy, third trimester 2018     Priority: Medium     9/10/18: SARAH @ 36+5 from [place]  Has NO records- RN has attempted multiple times and is unable to obtain records  - GBS done = ___  - Pre-e labs ordered d/t pt report of elevated BP on anti-HTN medication at beginning of pregnancy and elevated BP at end of previous pregnancy?  - GC/CT done  - Abo/Rh T&S done  - Growth us  with next visit d/t FH > dates    - Consider remaining prenatal labs at next visit? Future orders in place.       Large for dates- FH > dates 09/10/2018     Priority: Medium     9/10/18: Fundal height > dates- growth us ordered         HISTORIES  ============  No Known Allergies  Past Medical History:   Diagnosis Date     Gallstone     2018 in pregnancy     Past Surgical History:   Procedure Laterality Date     NO HISTORY OF SURGERY     .  Family History   Problem Relation Age of Onset     Hypertension Mother      Asthma Mother      Social History   Substance Use Topics     Smoking status: Never Smoker     Smokeless tobacco: Never Used     Alcohol use Yes      Comment: rare before stopped in pregnancy     Obstetric History       T2      L2     SAB0   TAB0   Ectopic0   Multiple0   Live Births2       # Outcome Date GA Lbr Elie/2nd Weight Sex Delivery Anes PTL Lv   3 Current            2 Term 16 39w2d  3.969 kg (8 lb 12 oz) M    RAHCNA      Name: Jurgen   1 Term 09 39w0d  3.09 kg (6 lb 13 oz) F    RACHNA      Name: Kelly           LABS:   ===========  Prenatal Labs:  Rhogam not indicated   Lab Results   Component Value Date    ABO O 09/10/2018    RH Pos 09/10/2018    AS Neg 09/10/2018    HEPBANG Negative 2018    HGB 9.4 (L) 2018     Rubella immune  Lab Results   Component Value Date    GBS Negative 09/10/2018     Other labs:  Results for orders placed or performed during the hospital encounter of 18 (from the past 24 hour(s))   Protein  random urine with Creat Ratio   Result Value Ref Range    Protein Random Urine 0.22 g/L    Protein Total Urine g/gr Creatinine 0.23 (H) 0 - 0.2 g/g Cr   Creatinine urine calculation only   Result Value Ref Range    Creatinine Urine 96 mg/dL   CBC with platelets   Result Value Ref Range    WBC 6.8 4.0 - 11.0 10e9/L    RBC Count 4.19 3.8 - 5.2 10e12/L    Hemoglobin 9.4 (L) 11.7 - 15.7 g/dL    Hematocrit 30.7 (L) 35.0 - 47.0 %    MCV 73 (L)  78 - 100 fl    MCH 22.4 (L) 26.5 - 33.0 pg    MCHC 30.6 (L) 31.5 - 36.5 g/dL    RDW 14.6 10.0 - 15.0 %    Platelet Count 237 150 - 450 10e9/L   AST   Result Value Ref Range    AST 19 0 - 45 U/L   ALT   Result Value Ref Range    ALT 15 0 - 50 U/L   Basic metabolic panel   Result Value Ref Range    Sodium 137 133 - 144 mmol/L    Potassium 3.8 3.4 - 5.3 mmol/L    Chloride 106 94 - 109 mmol/L    Carbon Dioxide 21 20 - 32 mmol/L    Anion Gap 10 3 - 14 mmol/L    Glucose 71 70 - 99 mg/dL    Urea Nitrogen 5 (L) 7 - 30 mg/dL    Creatinine 0.37 (L) 0.52 - 1.04 mg/dL    GFR Estimate >90 >60 mL/min/1.7m2    GFR Estimate If Black >90 >60 mL/min/1.7m2    Calcium 8.2 (L) 8.5 - 10.1 mg/dL       ROS  =========  Pt denies significant respiratory, cardiovacular, GI, or muscular/skeletalcomplaints.    See RN data base ROS.     PHYSICAL EXAM:  ===============  /84  Temp 98.2  F (36.8  C) (Oral)  Resp 20  LMP 12/27/2017  General appearance: uncomfortable   GENERAL APPEARANCE: healthy, alert and no distress  RESP: lungs clear to auscultation - no rales, rhonchi or wheezes  CV: regular rates and rhythm, normal S1 S2, no S3 or S4 and no murmur,and no varicosities  ABDOMEN:  soft, nontender, no epigastric pain  SKIN: no suspicious lesions or rashes  NEURO: Denies headache, blurred vision, other vision changes  PSYCH: mentation appears normal. and affect normal/bright  Legs: 1+ edema      Abdomen: gravid, vertex fetus per Leopold's, non-tender between contractions.   Cephalic presentation confirmed by BSUS  EFW-  9.75 lbs.   CONTACTIONS: every 1.5-2 minutes  FETAL HEART TONES: continuous EFM- baseline 150 with moderate variability and positive accelerations. No decelerations.  PELVIC EXAM: 7/90/-1  BLOODY SHOW: no   ROM:no  FLUID: none  ROMPLUS: not done    # Pain Assessment:  Current Pain Score 9/25/2018   Patient currently in pain? yes   Pain location Uterine   Pain descriptors Cramping;Discomfort   - Jennisha is experiencing pain  due to labor. Pain management was discussed and the plan was created in a collaborative fashion.  Libby's response to the current recommendations: engaged  - Plan: Nitrous oxide      ASSESSMENT:  ==============  IUP @ 38w6d admitted in active labor   NST REACTIVE  Fetal Heart Rate - category one  GBS- negative  Chronic hypertension, not on medication    PLAN:  ===========  Admit - see IP orders  Pain medication options reviewed. Pt is interested in nitrous  Expectant management of labor  Pt gives verbal consent for AMTSL  Anticipate     AHMET Bishop CNM

## 2018-09-26 VITALS
RESPIRATION RATE: 18 BRPM | HEART RATE: 91 BPM | SYSTOLIC BLOOD PRESSURE: 134 MMHG | DIASTOLIC BLOOD PRESSURE: 82 MMHG | TEMPERATURE: 98.2 F

## 2018-09-26 PROBLEM — Z36.89 ENCOUNTER FOR TRIAGE IN PREGNANT PATIENT: Status: RESOLVED | Noted: 2018-09-25 | Resolved: 2018-09-26

## 2018-09-26 LAB — T PALLIDUM AB SER QL: NONREACTIVE

## 2018-09-26 PROCEDURE — 25000132 ZZH RX MED GY IP 250 OP 250 PS 637: Performed by: ADVANCED PRACTICE MIDWIFE

## 2018-09-26 PROCEDURE — 25000128 H RX IP 250 OP 636: Performed by: ADVANCED PRACTICE MIDWIFE

## 2018-09-26 PROCEDURE — 90686 IIV4 VACC NO PRSV 0.5 ML IM: CPT | Performed by: ADVANCED PRACTICE MIDWIFE

## 2018-09-26 RX ORDER — ACETAMINOPHEN 325 MG/1
650 TABLET ORAL EVERY 4 HOURS PRN
Qty: 100 TABLET | Refills: 0 | Status: SHIPPED | OUTPATIENT
Start: 2018-09-26

## 2018-09-26 RX ORDER — DIPHENHYDRAMINE HYDROCHLORIDE 50 MG/ML
50 INJECTION INTRAMUSCULAR; INTRAVENOUS
Status: DISCONTINUED | OUTPATIENT
Start: 2018-09-26 | End: 2018-09-26 | Stop reason: HOSPADM

## 2018-09-26 RX ORDER — METHYLPREDNISOLONE SODIUM SUCCINATE 125 MG/2ML
125 INJECTION, POWDER, LYOPHILIZED, FOR SOLUTION INTRAMUSCULAR; INTRAVENOUS
Status: DISCONTINUED | OUTPATIENT
Start: 2018-09-26 | End: 2018-09-26 | Stop reason: HOSPADM

## 2018-09-26 RX ORDER — OXYCODONE HYDROCHLORIDE 5 MG/1
5 TABLET ORAL
Status: COMPLETED | OUTPATIENT
Start: 2018-09-26 | End: 2018-09-26

## 2018-09-26 RX ADMIN — IBUPROFEN 800 MG: 800 TABLET ORAL at 11:46

## 2018-09-26 RX ADMIN — Medication 1 TABLET: at 09:13

## 2018-09-26 RX ADMIN — IRON SUCROSE 200 MG: 20 INJECTION, SOLUTION INTRAVENOUS at 11:28

## 2018-09-26 RX ADMIN — ACETAMINOPHEN 650 MG: 325 TABLET, FILM COATED ORAL at 10:13

## 2018-09-26 RX ADMIN — INFLUENZA A VIRUS A/MICHIGAN/45/2015 X-275 (H1N1) ANTIGEN (FORMALDEHYDE INACTIVATED), INFLUENZA A VIRUS A/SINGAPORE/INFIMH-16-0019/2016 IVR-186 (H3N2) ANTIGEN (FORMALDEHYDE INACTIVATED), INFLUENZA B VIRUS B/PHUKET/3073/2013 ANTIGEN (FORMALDEHYDE INACTIVATED), AND INFLUENZA B VIRUS B/MARYLAND/15/2016 BX-69A ANTIGEN (FORMALDEHYDE INACTIVATED) 0.5 ML: 15; 15; 15; 15 INJECTION, SUSPENSION INTRAMUSCULAR at 13:56

## 2018-09-26 RX ADMIN — ACETAMINOPHEN 650 MG: 325 TABLET, FILM COATED ORAL at 05:19

## 2018-09-26 RX ADMIN — OXYCODONE HYDROCHLORIDE 5 MG: 5 TABLET ORAL at 12:10

## 2018-09-26 RX ADMIN — IBUPROFEN 800 MG: 800 TABLET ORAL at 05:19

## 2018-09-26 RX ADMIN — SENNOSIDES AND DOCUSATE SODIUM 2 TABLET: 8.6; 5 TABLET ORAL at 07:40

## 2018-09-26 NOTE — PLAN OF CARE
Problem: Patient Care Overview  Goal: Plan of Care/Patient Progress Review  Outcome: Improving  Data: Vital signs and postpartum checks WDL - see flow record. Patient eating and drinking normally. Patient able to empty bladder independently and is up ambulating.  Patient performing self cares and is able to care for infant. Formula feeding during the night.  Action: Patient medicated during the shift for pain with Tylenol and Ibuprofen.  Patient education done see education record.  Response: Positive attachment behaviors observed with infant. Support persons  present.    Plan: Continue with the plan of care and notify provider if decline in status. Will continue to monitor and assess.

## 2018-09-26 NOTE — PLAN OF CARE
Problem: Patient Care Overview  Goal: Plan of Care/Patient Progress Review  Outcome: Adequate for Discharge Date Met: 09/26/18  Patient has been stable, got better control with a dose of oxycodone. Comfortable going home today with baby. Breastfeeding attempt using nipple shield but unable to tolerate baby's suckling. Advised by resource Rn to pump/hand express for breast stimulation. Discharge instructions went over with her and questions were addressed. Discharge today with baby.

## 2018-09-26 NOTE — DISCHARGE SUMMARY
[facility] Discharge Summary    Libby Mahajan MRN# [id number]   Age: 28 year old YOB: 1990     Date of Admission:  2018  Date of Discharge::  2018  Admitting Physician:  AHMET Xie CNM  Discharge Physician:  Ya Figueroa CNM, MS      Home clinic: [facility]          Admission Diagnoses:   Maternity(renato 10/03/18) Labor  Encounter for triage in pregnant patient  Labor and delivery indication for care or intervention   (normal spontaneous vaginal delivery)          Discharge Diagnosis:   Normal spontaneous vaginal delivery  Intrauterine pregnancy at 38 weeks gestation          Procedures:   Procedure(s): IV Iron infusion x1       No other significant procedures performed during this admission           Medications Prior to Admission:     Prescriptions Prior to Admission   Medication Sig Dispense Refill Last Dose     calcium carbonate (TUMS) 500 MG chewable tablet Take 1 chew tab by mouth 2 times daily   2018 at Unknown time     [DISCONTINUED] Acetaminophen (TYLENOL PO) Take 650 mg by mouth as needed for mild pain or fever   Past Week at Unknown time     [DISCONTINUED] doxylamine (UNISOM) 25 MG TABS tablet Take 1 tablet (25 mg) by mouth At Bedtime 30 each 1 2018 at Unknown time     [DISCONTINUED] ferrous sulfate (IRON) 325 (65 Fe) MG tablet Take 1 tablet (325 mg) by mouth daily (with breakfast) 30 tablet 2 Unknown at Unknown time             Discharge Medications:     Current Discharge Medication List      START taking these medications    Details   ferrous fumarate 65 mg, [place]. FE,-Vitamin C 125 mg (VITRON C)  MG TABS tablet Take 1 tablet by mouth daily  Qty: 60 tablet, Refills: 3    Associated Diagnoses: Postpartum anemia      ibuprofen (ADVIL/MOTRIN) 800 MG tablet Take 1 tablet (800 mg) by mouth every 6 hours as needed for other (cramping)  Qty: 90 tablet, Refills: 0    Associated Diagnoses:  (normal spontaneous vaginal  delivery)         CONTINUE these medications which have CHANGED    Details   acetaminophen (TYLENOL) 325 MG tablet Take 2 tablets (650 mg) by mouth every 4 hours as needed for mild pain or fever (greater than or equal to 38  C /100.4  F (oral) or 38.5  C/ 101.4  F (core).)  Qty: 100 tablet, Refills: 0    Associated Diagnoses:  (normal spontaneous vaginal delivery)         CONTINUE these medications which have NOT CHANGED    Details   calcium carbonate (TUMS) 500 MG chewable tablet Take 1 chew tab by mouth 2 times daily         STOP taking these medications       doxylamine (UNISOM) 25 MG TABS tablet Comments:   Reason for Stopping:         ferrous sulfate (IRON) 325 (65 Fe) MG tablet Comments:   Reason for Stopping:                     Consultations:   No consultations were requested during this admission          Brief History of Labor:   DELIVERY NOTE:  Libby Mahajan is a 28-year-old  at 38w6d who presented to labor floor in active labor. She spontaneosly ruptured for clear fluid and rapidly progressed to complete and +3.  Pushed effectively without coaching. FHR with variable decels with pushing effort, moderate variability throughout. Head delivered OA and restituted to BIN . Loose nuchal cord x 1 reduced. Shoulders easily delivered under maternal effort.  Live female  delivered at 4:03 over an intact perineum under no anesthesia.  Spontaneous breath, vigorous cry, well flexed, HR>100. Infant directly to maternal abdomen, skin to skin. Delayed cord clamping for 5 minutes then clamped x2 and cut by pt's cousin/support person.   20 units of pitocin infusing after baby.  Cord blood obtained for typing.  Intact placenta spontaneously delivered via alvarado. 3 vessel cord. Fundus firm @ 2 FB below umbilicus after vigorous fundal massage.  Vagina, perineum, and rectum inspected, found to be intact. Mother and infant stable; continued skin to skin. Good family bonding observed.      Apgars 8/9.   "Weight pending.  QBL pending     Delivery Note:   IUP at 38 weeks 6 days gestation delivered on 2018.     delivery of a viable Female infant.  Weight : pending  Apgars of 8 at 1 minute and 9 at 5 minutes.  Labor was spontaneous.  Medications administered  in labor:  Pain Rx Nitrous Oxide; Antibiotics No  Perineum: Intact  Placenta-mechanism: spontaneous, intact,  with a 3 vessel cord.  Quantitative Blood Loss was pending  Complications of labor and delivery: Macrosomia  Anticipated Discharge Date: 2018  Birth attendants: AHMET Bishop CNM, APRN CNM      Assessment Day of Discharge    Vital signs:  Temp: 98.2  F (36.8  C) Temp src: Oral BP: 130/87 Pulse: 86   Resp: 16            Estimated body mass index is 31.78 kg/(m^2) as calculated from the following:    Height as of 18: 1.651 m (5' 5\").    Weight as of 18: 86.6 kg (191 lb).      Breasts: Soft, filling  Nipples: Slightly cracked/bruised, slightly tender  Abdomen: Soft, Non-tender  Uterus: Fundus Firm, Non-tender, located 1cm below the umbilicus   Lochia: Rubra, appropriate amount    Perineum:  Well-approximated, healing well  Lower Extremities: Trace Edema Bilateral, Negative Mirela's Sign           Hospital Course:   The patient's hospital course was unremarkable.  On discharge, her pain was well controlled. Vaginal bleeding is similar to peak menstrual flow.  Voiding without difficulty.  Ambulating well and tolerating a normal diet.  No fever.  Breastfeeding initiated but sore nipples.  Infant is stable.  No bowel movement yet.  BPs normotensive to mild range, no HA/visual changes, RUQ or epigastric pain. Mood Stable, good family supports identified. Plan IV iron x1 before discharge and then continue daily Vitron C. Agreeable to BP check in 1 week and prn if s/s.  Encouraged exclusive breastfeeding if wishes to achieve the three months of breastfeeding she had with her previous children.  " Encouraged pumping combined with manual expression q2-3 hours if wants to give breastmilk but not put baby to breast. Consider nipple shield prn while nipples heal.  Interested in LARC but undecided which.  Used DMPA in the past but didn't like not having a period and felt bloated. Pt had gallstones identified in pregnancy, would like to see how she fares postpartum before consulting surgery. She was discharged on post-partum day #1 in the afternoon ~36 hours.    Post-partum hemoglobin:   Hemoglobin   Date Value Ref Range Status   2018 8.9 (L) 11.7 - 15.7 g/dL Final        Rh:positive  Rubella status:Immune  Plan for contraception:LARC - undecided.   Reviewed Chapter One of  [facility] including warning signs of postpartum, activity level, avoiding IC for 6 weeks, Tub soaks BID, Kegels, abdominal exercises, breast care,  postpartum depression/anxiety .kdcp. Pt verbalized understanding with teach back.          Discharge Instructions and Follow-Up:   Discharge diet: Regular, Iron Rich, High Fiber, Minimum 80oz water daily   Discharge activity: Pelvic rest: abstain from intercourse and do not use tampons for 6 week(s)   Discharge follow-up: Follow up with CNM in 6 weeks for PP exam and LARC if desires  Follow up with CNM in 1 week for BP check  Follow up with General Surgery for consult prn Verito   Wound care: Drink plenty of fluids  Ice to area for comfort  Keep wound clean and dry           Discharge Disposition:   Discharged to home        AHMET Avalos CNM

## 2018-09-26 NOTE — PROVIDER NOTIFICATION
09/26/18 1157   Provider Notification   Provider Name/Title Henry   Method of Notification Electronic Page   Request Evaluate-Remote   Notification Reason Medication Request  (Oxycodone)

## 2018-10-04 ENCOUNTER — OFFICE VISIT (OUTPATIENT)
Dept: OBGYN | Facility: CLINIC | Age: 28
End: 2018-10-04
Attending: ADVANCED PRACTICE MIDWIFE
Payer: MEDICAID

## 2018-10-04 ENCOUNTER — APPOINTMENT (OUTPATIENT)
Dept: ULTRASOUND IMAGING | Facility: CLINIC | Age: 28
End: 2018-10-04
Attending: EMERGENCY MEDICINE
Payer: MEDICAID

## 2018-10-04 ENCOUNTER — HOSPITAL ENCOUNTER (INPATIENT)
Facility: CLINIC | Age: 28
LOS: 2 days | Discharge: HOME | End: 2018-10-06
Attending: EMERGENCY MEDICINE | Admitting: OBSTETRICS & GYNECOLOGY
Payer: MEDICAID

## 2018-10-04 VITALS
HEIGHT: 65 IN | BODY MASS INDEX: 28.21 KG/M2 | SYSTOLIC BLOOD PRESSURE: 159 MMHG | HEART RATE: 57 BPM | DIASTOLIC BLOOD PRESSURE: 98 MMHG | WEIGHT: 169.3 LBS

## 2018-10-04 DIAGNOSIS — Z86.79 HISTORY OF CHRONIC HYPERTENSION: Primary | ICD-10-CM

## 2018-10-04 LAB
ALBUMIN SERPL-MCNC: 3.1 G/DL (ref 3.4–5)
ALBUMIN UR-MCNC: 10 MG/DL
ALP SERPL-CCNC: 121 U/L (ref 40–150)
ALT SERPL W P-5'-P-CCNC: 31 U/L (ref 0–50)
AMORPH CRY #/AREA URNS HPF: ABNORMAL /HPF
ANION GAP SERPL CALCULATED.3IONS-SCNC: 8 MMOL/L (ref 3–14)
APPEARANCE UR: CLEAR
AST SERPL W P-5'-P-CCNC: 26 U/L (ref 0–45)
BACTERIA #/AREA URNS HPF: ABNORMAL /HPF
BASOPHILS # BLD AUTO: 0 10E9/L (ref 0–0.2)
BASOPHILS NFR BLD AUTO: 0.1 %
BILIRUB SERPL-MCNC: 0.3 MG/DL (ref 0.2–1.3)
BILIRUB UR QL STRIP: NEGATIVE
BUN SERPL-MCNC: 10 MG/DL (ref 7–30)
CALCIUM SERPL-MCNC: 8.6 MG/DL (ref 8.5–10.1)
CHLORIDE SERPL-SCNC: 108 MMOL/L (ref 94–109)
CO2 SERPL-SCNC: 26 MMOL/L (ref 20–32)
COLOR UR AUTO: YELLOW
CREAT SERPL-MCNC: 0.55 MG/DL (ref 0.52–1.04)
CREAT UR-MCNC: 122 MG/DL
DIFFERENTIAL METHOD BLD: ABNORMAL
EOSINOPHIL # BLD AUTO: 0.1 10E9/L (ref 0–0.7)
EOSINOPHIL NFR BLD AUTO: 1 %
ERYTHROCYTE [DISTWIDTH] IN BLOOD BY AUTOMATED COUNT: 15.2 % (ref 10–15)
GFR SERPL CREATININE-BSD FRML MDRD: >90 ML/MIN/1.7M2
GLUCOSE SERPL-MCNC: 82 MG/DL (ref 70–99)
GLUCOSE UR STRIP-MCNC: NEGATIVE MG/DL
HCT VFR BLD AUTO: 35.5 % (ref 35–47)
HGB BLD-MCNC: 10.7 G/DL (ref 11.7–15.7)
HGB UR QL STRIP: NEGATIVE
IMM GRANULOCYTES # BLD: 0 10E9/L (ref 0–0.4)
IMM GRANULOCYTES NFR BLD: 0.1 %
KETONES UR STRIP-MCNC: NEGATIVE MG/DL
LEUKOCYTE ESTERASE UR QL STRIP: ABNORMAL
LIPASE SERPL-CCNC: 78 U/L (ref 73–393)
LYMPHOCYTES # BLD AUTO: 2.4 10E9/L (ref 0.8–5.3)
LYMPHOCYTES NFR BLD AUTO: 26.3 %
MCH RBC QN AUTO: 22.3 PG (ref 26.5–33)
MCHC RBC AUTO-ENTMCNC: 30.1 G/DL (ref 31.5–36.5)
MCV RBC AUTO: 74 FL (ref 78–100)
MONOCYTES # BLD AUTO: 0.5 10E9/L (ref 0–1.3)
MONOCYTES NFR BLD AUTO: 5.2 %
MUCOUS THREADS #/AREA URNS LPF: PRESENT /LPF
NEUTROPHILS # BLD AUTO: 6.1 10E9/L (ref 1.6–8.3)
NEUTROPHILS NFR BLD AUTO: 67.3 %
NITRATE UR QL: NEGATIVE
NRBC # BLD AUTO: 0 10*3/UL
NRBC BLD AUTO-RTO: 0 /100
PH UR STRIP: 6 PH (ref 5–7)
PLATELET # BLD AUTO: 368 10E9/L (ref 150–450)
POTASSIUM SERPL-SCNC: 4.3 MMOL/L (ref 3.4–5.3)
PROT SERPL-MCNC: 7.2 G/DL (ref 6.8–8.8)
PROT UR-MCNC: 0.22 G/L
PROT/CREAT 24H UR: 0.18 G/G CR (ref 0–0.2)
RBC # BLD AUTO: 4.8 10E12/L (ref 3.8–5.2)
RBC #/AREA URNS AUTO: 4 /HPF (ref 0–2)
SODIUM SERPL-SCNC: 142 MMOL/L (ref 133–144)
SOURCE: ABNORMAL
SP GR UR STRIP: 1.02 (ref 1–1.03)
SQUAMOUS #/AREA URNS AUTO: 7 /HPF (ref 0–1)
UROBILINOGEN UR STRIP-MCNC: NORMAL MG/DL (ref 0–2)
WBC # BLD AUTO: 9 10E9/L (ref 4–11)
WBC #/AREA URNS AUTO: 10 /HPF (ref 0–5)

## 2018-10-04 PROCEDURE — 25000128 H RX IP 250 OP 636: Performed by: STUDENT IN AN ORGANIZED HEALTH CARE EDUCATION/TRAINING PROGRAM

## 2018-10-04 PROCEDURE — 25000128 H RX IP 250 OP 636: Performed by: EMERGENCY MEDICINE

## 2018-10-04 PROCEDURE — 99285 EMERGENCY DEPT VISIT HI MDM: CPT | Mod: Z6 | Performed by: EMERGENCY MEDICINE

## 2018-10-04 PROCEDURE — 76705 ECHO EXAM OF ABDOMEN: CPT

## 2018-10-04 PROCEDURE — 25000132 ZZH RX MED GY IP 250 OP 250 PS 637: Performed by: STUDENT IN AN ORGANIZED HEALTH CARE EDUCATION/TRAINING PROGRAM

## 2018-10-04 PROCEDURE — 84156 ASSAY OF PROTEIN URINE: CPT | Performed by: EMERGENCY MEDICINE

## 2018-10-04 PROCEDURE — 81001 URINALYSIS AUTO W/SCOPE: CPT | Performed by: EMERGENCY MEDICINE

## 2018-10-04 PROCEDURE — 85025 COMPLETE CBC W/AUTO DIFF WBC: CPT | Performed by: EMERGENCY MEDICINE

## 2018-10-04 PROCEDURE — 12000032 ZZH R&B OB CRITICAL UMMC

## 2018-10-04 PROCEDURE — 83690 ASSAY OF LIPASE: CPT | Performed by: EMERGENCY MEDICINE

## 2018-10-04 PROCEDURE — 80053 COMPREHEN METABOLIC PANEL: CPT | Performed by: EMERGENCY MEDICINE

## 2018-10-04 PROCEDURE — 96374 THER/PROPH/DIAG INJ IV PUSH: CPT | Performed by: EMERGENCY MEDICINE

## 2018-10-04 PROCEDURE — 99285 EMERGENCY DEPT VISIT HI MDM: CPT | Mod: 25 | Performed by: EMERGENCY MEDICINE

## 2018-10-04 PROCEDURE — G0463 HOSPITAL OUTPT CLINIC VISIT: HCPCS | Mod: ZF

## 2018-10-04 RX ORDER — LANOLIN 100 %
OINTMENT (GRAM) TOPICAL
Status: DISCONTINUED | OUTPATIENT
Start: 2018-10-04 | End: 2018-10-06 | Stop reason: HOSPADM

## 2018-10-04 RX ORDER — ACETAMINOPHEN 325 MG/1
650 TABLET ORAL EVERY 4 HOURS PRN
Status: DISCONTINUED | OUTPATIENT
Start: 2018-10-04 | End: 2018-10-06 | Stop reason: HOSPADM

## 2018-10-04 RX ORDER — MAGNESIUM SULFATE HEPTAHYDRATE 40 MG/ML
4 INJECTION, SOLUTION INTRAVENOUS
Status: DISCONTINUED | OUTPATIENT
Start: 2018-10-04 | End: 2018-10-06 | Stop reason: HOSPADM

## 2018-10-04 RX ORDER — NIFEDIPINE 10 MG/1
10 CAPSULE ORAL
Status: DISCONTINUED | OUTPATIENT
Start: 2018-10-04 | End: 2018-10-06 | Stop reason: HOSPADM

## 2018-10-04 RX ORDER — LABETALOL HYDROCHLORIDE 5 MG/ML
80 INJECTION, SOLUTION INTRAVENOUS EVERY 10 MIN PRN
Status: DISCONTINUED | OUTPATIENT
Start: 2018-10-04 | End: 2018-10-06 | Stop reason: HOSPADM

## 2018-10-04 RX ORDER — LABETALOL HYDROCHLORIDE 5 MG/ML
20 INJECTION, SOLUTION INTRAVENOUS ONCE
Status: COMPLETED | OUTPATIENT
Start: 2018-10-04 | End: 2018-10-04

## 2018-10-04 RX ORDER — AMOXICILLIN 250 MG
1 CAPSULE ORAL 2 TIMES DAILY
Status: DISCONTINUED | OUTPATIENT
Start: 2018-10-04 | End: 2018-10-06 | Stop reason: HOSPADM

## 2018-10-04 RX ORDER — MAGNESIUM SULFATE IN WATER 40 MG/ML
2 INJECTION, SOLUTION INTRAVENOUS CONTINUOUS
Status: DISPENSED | OUTPATIENT
Start: 2018-10-04 | End: 2018-10-05

## 2018-10-04 RX ORDER — HYDROCORTISONE 2.5 %
CREAM (GRAM) TOPICAL 3 TIMES DAILY PRN
Status: DISCONTINUED | OUTPATIENT
Start: 2018-10-04 | End: 2018-10-06 | Stop reason: HOSPADM

## 2018-10-04 RX ORDER — LABETALOL 100 MG/1
100 TABLET, FILM COATED ORAL EVERY 12 HOURS SCHEDULED
Status: DISCONTINUED | OUTPATIENT
Start: 2018-10-04 | End: 2018-10-05

## 2018-10-04 RX ORDER — LIDOCAINE 40 MG/G
CREAM TOPICAL
Status: DISCONTINUED | OUTPATIENT
Start: 2018-10-04 | End: 2018-10-06 | Stop reason: HOSPADM

## 2018-10-04 RX ORDER — BISACODYL 10 MG
10 SUPPOSITORY, RECTAL RECTAL DAILY PRN
Status: DISCONTINUED | OUTPATIENT
Start: 2018-10-06 | End: 2018-10-06 | Stop reason: HOSPADM

## 2018-10-04 RX ORDER — MAGNESIUM SULFATE HEPTAHYDRATE 500 MG/ML
4 INJECTION, SOLUTION INTRAMUSCULAR; INTRAVENOUS
Status: DISCONTINUED | OUTPATIENT
Start: 2018-10-04 | End: 2018-10-06 | Stop reason: HOSPADM

## 2018-10-04 RX ORDER — LORAZEPAM 2 MG/ML
2 INJECTION INTRAMUSCULAR
Status: DISCONTINUED | OUTPATIENT
Start: 2018-10-04 | End: 2018-10-06 | Stop reason: HOSPADM

## 2018-10-04 RX ORDER — LABETALOL HYDROCHLORIDE 5 MG/ML
20 INJECTION, SOLUTION INTRAVENOUS EVERY 10 MIN PRN
Status: DISCONTINUED | OUTPATIENT
Start: 2018-10-04 | End: 2018-10-06 | Stop reason: HOSPADM

## 2018-10-04 RX ORDER — MAGNESIUM SULFATE HEPTAHYDRATE 40 MG/ML
4 INJECTION, SOLUTION INTRAVENOUS ONCE
Status: COMPLETED | OUTPATIENT
Start: 2018-10-04 | End: 2018-10-04

## 2018-10-04 RX ORDER — CALCIUM GLUCONATE 94 MG/ML
1 INJECTION, SOLUTION INTRAVENOUS
Status: DISCONTINUED | OUTPATIENT
Start: 2018-10-04 | End: 2018-10-06 | Stop reason: HOSPADM

## 2018-10-04 RX ORDER — AMOXICILLIN 250 MG
2 CAPSULE ORAL 2 TIMES DAILY
Status: DISCONTINUED | OUTPATIENT
Start: 2018-10-04 | End: 2018-10-06 | Stop reason: HOSPADM

## 2018-10-04 RX ORDER — LABETALOL HYDROCHLORIDE 5 MG/ML
40 INJECTION, SOLUTION INTRAVENOUS EVERY 10 MIN PRN
Status: DISCONTINUED | OUTPATIENT
Start: 2018-10-04 | End: 2018-10-06 | Stop reason: HOSPADM

## 2018-10-04 RX ORDER — SODIUM CHLORIDE, SODIUM LACTATE, POTASSIUM CHLORIDE, CALCIUM CHLORIDE 600; 310; 30; 20 MG/100ML; MG/100ML; MG/100ML; MG/100ML
10-125 INJECTION, SOLUTION INTRAVENOUS CONTINUOUS
Status: DISCONTINUED | OUTPATIENT
Start: 2018-10-04 | End: 2018-10-06 | Stop reason: HOSPADM

## 2018-10-04 RX ADMIN — MAGNESIUM SULFATE IN WATER 4 G: 40 INJECTION, SOLUTION INTRAVENOUS at 20:43

## 2018-10-04 RX ADMIN — ACETAMINOPHEN 650 MG: 325 TABLET, FILM COATED ORAL at 20:55

## 2018-10-04 RX ADMIN — LABETALOL HYDROCHLORIDE 100 MG: 100 TABLET, FILM COATED ORAL at 20:52

## 2018-10-04 RX ADMIN — Medication 20 MG: at 17:58

## 2018-10-04 RX ADMIN — MAGNESIUM SULFATE IN WATER 2 G/HR: 40 INJECTION, SOLUTION INTRAVENOUS at 21:25

## 2018-10-04 RX ADMIN — SENNOSIDES AND DOCUSATE SODIUM 2 TABLET: 8.6; 5 TABLET ORAL at 20:25

## 2018-10-04 RX ADMIN — SODIUM CHLORIDE, POTASSIUM CHLORIDE, SODIUM LACTATE AND CALCIUM CHLORIDE 75 ML/HR: 600; 310; 30; 20 INJECTION, SOLUTION INTRAVENOUS at 20:26

## 2018-10-04 ASSESSMENT — ENCOUNTER SYMPTOMS
FEVER: 0
ABDOMINAL PAIN: 1
NAUSEA: 0
COUGH: 0
HYPERTENSION: 1
HEADACHES: 0
SHORTNESS OF BREATH: 0
DYSURIA: 0
VOMITING: 0

## 2018-10-04 ASSESSMENT — ACTIVITIES OF DAILY LIVING (ADL)
SWALLOWING: 0-->SWALLOWS FOODS/LIQUIDS WITHOUT DIFFICULTY
AMBULATION: 0-->INDEPENDENT
DRESS: 0-->INDEPENDENT
FALL_HISTORY_WITHIN_LAST_SIX_MONTHS: NO
RETIRED_EATING: 0-->INDEPENDENT
BATHING: 0-->INDEPENDENT
COGNITION: 0 - NO COGNITION ISSUES REPORTED
RETIRED_COMMUNICATION: 0-->UNDERSTANDS/COMMUNICATES WITHOUT DIFFICULTY
TRANSFERRING: 0-->INDEPENDENT
TOILETING: 0-->INDEPENDENT

## 2018-10-04 NOTE — IP AVS SNAPSHOT
MRN:[id number]                      After Visit Summary   10/4/2018    Libby Mahajan    MRN: [id number]           Thank you!     Thank you for choosing [facility] for your care. Our goal is always to provide you with excellent care. Hearing back from our patients is one way we can continue to improve our services. Please take a few minutes to complete the written survey that you may receive in the mail after you visit with us. Thank you!        Patient Information     Date Of Birth          1990        About your hospital stay     You were [facility] on:  October 4, 2018 [facility]    You were discharged on:  October 6, 2018        Reason for your hospital stay       Preeclampsia            Reason for your hospital stay       Maternity care                  Who to Call     For medical emergencies, please call 911.  For non-urgent questions about your medical care, please call your primary care provider or clinic, [phone]          Attending Provider     Provider Specialty    Su Peterson MD Emergency Medicine Emergency Medical Services    [place], Greer Potter MD OB/Gyn       Primary Care Provider Office Phone # Fax #    [facility] [phone]      After Care Instructions     Activity       Review discharge instructions            Diet       Resume previous diet            Discharge Instructions - Hypertensive disorders patients       High Blood pressure patients to follow up in clinic or via home care within one week for a blood pressure check            Discharge Instructions - Postpartum visit       Schedule postpartum visit with your provider and return to clinic in 6 weeks.                  Follow-up Appointments     Adult [facility] Follow-up and recommended labs and tests       Follow up with primary care provider, [facility], within 7 days for blood pressure check.    Appointments on  [place] and/or [facility] (with [facility] or [facility] provider or service). Call [phone] if you haven't heard regarding these appointments within 7 days of discharge.            Follow Up and recommended labs and tests       Follow-up on Monday for a blood pressure check.  Call [phone] to schedule.                  Your next 10 appointments already scheduled     Oct 08, 2018  2:00 PM CDT   Nurse Visit with [facility] Nurse   [facility] ([facility])    [facility] [address]  [address]  [facility] [address] [phone]              Further instructions from your care team       Preeclampsia   Call your doctor right away if you have any of the following:  - Edema (swelling) in your face or hands  - Rapid weight gain-about 1 pound or more in a day  - Headache  - Abdominal pain on your right side  - Vision problems (flashes or spots)  - You have questions or concerns once you return home.      Pending Results     No orders found from 10/2/2018 to 10/5/2018.            Statement of Approval     Ordered          10/06/18 4175  I have reviewed and agree with all the recommendations and orders detailed in this document.  EFFECTIVE NOW     Approved and electronically signed by:  Nicole Stoddard MD             Admission Information     Date & Time Provider Department Dept. Phone    10/4/2018 Greer Kumar MD [facility] [phone]      Your Vitals Were     Blood Pressure Pulse Temperature Respirations Weight Last Period    139/92 56 99.3  F (37.4  C) (Oral) 18 75.6 kg (166 lb 10.7 oz) 12/27/2017    Pulse Oximetry BMI (Body Mass Index)                95% 27.73 kg/m2          Care EveryWhere ID     This is your Care EveryWhere ID. This could be used by other organizations to access your [facility] medical records  [id number]        Equal Access to Services     GAGE FIGUEROA : madelin Small qaybta kaalmada adeegyada,  ladi vargasvivek villalbash la'aan ah. So [facility] [phone].    ATENCIÓN: Si margiela khurram, tiene a bhat disposición servicios gratuitos de asistencia lingüística. Raj al [phone].    We comply with applicable federal civil rights laws and [place] laws. We do not discriminate on the basis of race, color, national origin, age, disability, sex, sexual orientation, or gender identity.               Review of your medicines      START taking        Dose / Directions    labetalol 200 MG tablet   Commonly known as:  NORMODYNE   Used for:  Pre-eclampsia in postpartum period        Dose:  200 mg   Take 1 tablet (200 mg) by mouth 3 times daily   Quantity:  90 tablet   Refills:  1       NIFEdipine ER 30 MG Tb24   Commonly known as:  ADALAT CC   Used for:  Pre-eclampsia in postpartum period        Dose:  30 mg   Start taking on:  10/7/2018   Take 1 tablet (30 mg) by mouth daily   Quantity:  30 tablet   Refills:  1         CONTINUE these medicines which have NOT CHANGED        Dose / Directions    acetaminophen 325 MG tablet   Commonly known as:  TYLENOL   Used for:   (normal spontaneous vaginal delivery)        Dose:  650 mg   Take 2 tablets (650 mg) by mouth every 4 hours as needed for mild pain or fever   Quantity:  100 tablet   Refills:  0       ferrous fumarate 65 mg ([place]. FE)-Vitamin C 125 mg  MG Tabs tablet   Commonly known as:  VITRON C   Used for:  Postpartum anemia        Dose:  1 tablet   Take 1 tablet by mouth daily   Quantity:  60 tablet   Refills:  3       ibuprofen 800 MG tablet   Commonly known as:  ADVIL/MOTRIN   Used for:   (normal spontaneous vaginal delivery)        Dose:  800 mg   Take 1 tablet (800 mg) by mouth every 6 hours as needed for other (cramping)   Quantity:  90 tablet   Refills:  0            Where to get your medicines      Some of these will need a paper prescription and others can be bought over the counter. Ask your nurse if you have questions.     Bring a paper  prescription for each of these medications     labetalol 200 MG tablet    NIFEdipine ER 30 MG Tb24                Protect others around you: Learn how to safely use, store and throw away your medicines at www.disposemymeds.org.             Medication List: This is a list of all your medications and when to take them. Check marks below indicate your daily home schedule. Keep this list as a reference.      Medications           Morning Afternoon Evening Bedtime As Needed    acetaminophen 325 MG tablet   Commonly known as:  TYLENOL   Take 2 tablets (650 mg) by mouth every 4 hours as needed for mild pain or fever   Last time this was given:  650 mg on 10/6/2018  1:48 PM                                ferrous fumarate 65 mg ([place]. FE)-Vitamin C 125 mg  MG Tabs tablet   Commonly known as:  VITRON C   Take 1 tablet by mouth daily                                ibuprofen 800 MG tablet   Commonly known as:  ADVIL/MOTRIN   Take 1 tablet (800 mg) by mouth every 6 hours as needed for other (cramping)                                labetalol 200 MG tablet   Commonly known as:  NORMODYNE   Take 1 tablet (200 mg) by mouth 3 times daily   Last time this was given:  200 mg on 10/6/2018  8:11 AM                                NIFEdipine ER 30 MG Tb24   Commonly known as:  ADALAT CC   Take 1 tablet (30 mg) by mouth daily   Start taking on:  10/7/2018   Last time this was given:  30 mg on 10/6/2018 10:54 AM

## 2018-10-04 NOTE — MR AVS SNAPSHOT
After Visit Summary   10/4/2018    Libby Mahajan    MRN: [id number]           Patient Information     Date Of Birth          1990        Visit Information        Provider Department      10/4/2018 1:45 PM Ya Figueroa APRN CNM [facility]        Today's Diagnoses     History of chronic hypertension    -  1    Postpartum care following vaginal delivery           Follow-ups after your visit        Follow-up notes from your care team     Discussed this visit Return in about 4 days (around 10/8/2018) for BP Recheck.      Your next 10 appointments already scheduled     Oct 08, 2018  2:00 PM CDT   Nurse Visit with [facility] Nurse   [facility] ([facility])    [facility]  [address]  [address]  [facility] [id number] [phone]              Future tests that were ordered for you today     Open Standing Orders        Priority Remaining Interval Expires Ordered    Indwelling urinary catheter (Meyer) Routine 79287/22465 PRN  10/4/2018    Pulse oximetry nursing Routine 63456/06097 PRN  10/4/2018    Skin care Routine 46724/70862 PRN  10/4/2018    Ice to affected area Routine 66602/32510 PRN  10/4/2018    Perineal skin care Routine 39578/37154 PRN  10/4/2018    Abdominal binder Routine 59016/89854 PRN  10/4/2018    Aqua K heating pad Routine 1/1 CONDITIONAL X 1  10/4/2018    Breast pump Routine 38197/35414 PRN  10/4/2018    Activity: Up ad marcelle Routine 77091/60279 PRN  10/4/2018    Shower Routine 43235/54322 PRN  10/4/2018            Who to contact     Please call your clinic at [phone] to:    Ask questions about your health    Make or cancel appointments    Discuss your medicines    Learn about your test results    Speak to your doctor            Additional Information About Your Visit        Care EveryWhere ID     This is your Care EveryWhere ID. This could be used by other organizations to access your  "[facility] medical records  [id number]        Your Vitals Were     Pulse Height Last Period BMI (Body Mass Index)          57 1.651 m (5' 5\") 12/27/2017 28.17 kg/m2         Blood Pressure from Last 3 Encounters:   10/05/18 133/86   10/04/18 (!) 159/98   09/26/18 134/82    Weight from Last 3 Encounters:   10/05/18 75.9 kg (167 lb 4.8 oz)   10/04/18 76.8 kg (169 lb 4.8 oz)   09/24/18 86.6 kg (191 lb)              Today, you had the following     No orders found for display       Primary Care Provider Office Phone # Fax #    [facility] [phone]       [address]  [facility] [address]        Equal Access to Services     [facility] : Hadii vipul holland Sojustin, waaxda luqadaha, qaybta kaalmada adeyolieyada, ladi robles . So [facility] [phone].    ATENCIÓN: Si habla español, tiene a bhat disposición servicios gratuitos de asistencia lingüística. Llame al [phone].    We comply with applicable federal civil rights laws and [place] laws. We do not discriminate on the basis of race, color, national origin, age, disability, sex, sexual orientation, or gender identity.            Thank you!     Thank you for choosing [facility]  for your care. Our goal is always to provide you with excellent care. Hearing back from our patients is one way we can continue to improve our services. Please take a few minutes to complete the written survey that you may receive in the mail after your visit with us. Thank you!             Your Updated Medication List - Protect others around you: Learn how to safely use, store and throw away your medicines at www.disposemymeds.org.          This list is accurate as of 10/4/18  3:09 PM.  Always use your most recent med list.                   Brand Name Dispense Instructions for use Diagnosis    acetaminophen 325 MG tablet    TYLENOL    100 tablet    Take 2 tablets (650 mg) by mouth every 4 hours as " needed for mild pain or fever     (normal spontaneous vaginal delivery)       ferrous fumarate 65 mg (Elim IRA. FE)-Vitamin C 125 mg  MG Tabs tablet    VITRON C    60 tablet    Take 1 tablet by mouth daily    Postpartum anemia       ibuprofen 800 MG tablet    ADVIL/MOTRIN    90 tablet    Take 1 tablet (800 mg) by mouth every 6 hours as needed for other (cramping)     (normal spontaneous vaginal delivery)

## 2018-10-04 NOTE — IP AVS SNAPSHOT
[facility]    [address] [facility] [address]    Phone:  [phone]                                       After Visit Summary   10/4/2018    Libby Mahajan    MRN: [id number]           After Visit Summary Signature Page     I have received my discharge instructions, and my questions have been answered. I have discussed any challenges I see with this plan with the nurse or doctor.    ..........................................................................................................................................  Patient/Patient Representative Signature      ..........................................................................................................................................  Patient Representative Print Name and Relationship to Patient    ..................................................               ................................................  Date                                   Time    ..........................................................................................................................................  Reviewed by Signature/Title    ...................................................              ..............................................  Date                                               Time          22EPIC Rev 08/18

## 2018-10-04 NOTE — LETTER
"10/4/2018       RE: Libby Mahajan  [address] [facility] [address]     Dear Colleague,    Thank you for referring your patient, Libby Mahajan, to the [facility] at [facility]. Please see a copy of my visit note below.        Nursing Notes:   Kamilah KhannaQUINN  10/4/2018  2:22 PM  Addendum  Chief Complaint   Patient presents with     Postpartum Care     B/P Average 159/98     ================================================================      EXAM:  163/102 initial BP  BP (!) 159/98  Pulse 57  Ht 1.651 m (5' 5\")  Wt 76.8 kg (169 lb 4.8 oz)  LMP 2017  BMI 28.17 kg/m2    General: healthy, alert, mild distress and cooperative  Psych: NEGATIVE  Last PHQ-9 score on record= No Value exists for the : HP#PHQ9      ASSESSMENT:   Encounter Diagnoses   Name Primary?     History of chronic hypertension Yes     Postpartum care following vaginal delivery       Normal postpartum exam after   Pregnancy was complicated by:  Hypertension.      PLAN:    Triage for PP pre eclampsia work up. Spoke with charge nurse who stated pt should go to ER.  Gave ER report.  Pt aware of going to ER.      Follow up based on ER visit - may restart medication or just follow up with office on Monday for BP check.     Ya LEO CNM    Again, thank you for allowing me to participate in the care of your patient.      Sincerely,    AHMET Avalos CNM      "

## 2018-10-04 NOTE — LETTER
Date:October 9, 2018      Patient was self referred, no letter generated. Do not send.        [facility] Health Information

## 2018-10-04 NOTE — PROGRESS NOTES
"    Nursing Notes:   Kamilah Khanna CMA  10/4/2018  2:22 PM  Addendum  Chief Complaint   Patient presents with     Postpartum Care     B/P Average 159/98     ================================================================      EXAM:  163/102 initial BP  BP (!) 159/98  Pulse 57  Ht 1.651 m (5' 5\")  Wt 76.8 kg (169 lb 4.8 oz)  LMP 2017  BMI 28.17 kg/m2    General: healthy, alert, mild distress and cooperative  Psych: NEGATIVE  Last PHQ-9 score on record= No Value exists for the : HP#PHQ9      ASSESSMENT:   Encounter Diagnoses   Name Primary?     History of chronic hypertension Yes     Postpartum care following vaginal delivery       Normal postpartum exam after   Pregnancy was complicated by:  Hypertension.      PLAN:    Triage for PP pre eclampsia work up. Spoke with charge nurse who stated pt should go to ER.  Gave ER report.  Pt aware of going to ER.      Follow up based on ER visit - may restart medication or just follow up with office on Monday for BP check.     Ya LEO CNM  "

## 2018-10-04 NOTE — H&P
[facility]  Gynecology Consult     Libby Mahajan MRN# [id number]   Age: 28 year old YOB: 1990     Date of Admission: 10/4/2018    CC:  Libby Mahajan is a 28 year old  PPD#9 from pregnancy c/b cHTN (no meds) who presents for elevated BP noted at CNM visit today.    HPI: Pt reports she was started on labetalol at 8wks GA then stopped mid pregnancy when BP was normal. Towards the end of this pregnancy her BP started to elevate again but never restarted medications. She has been feeling well postpartum, no symptoms of preE and bleeding and cramping improving daily. She just had BP in the 160-170 systolic range in clinic today and so was sent in by her CNM.  She has a h/o of gHTN with last pregnancy, never had preeclampsia before.    ROS: Denies headache, changes in vision, chest pain, SOB, abdominal pain, nausea, vomiting, changes in bowel or bladder habits. + low back pain, scant vaginal bleedings/lochia.    PMH:   Past Medical History:   Diagnosis Date     Gallstone     2018 in pregnancy     PSHx:   Past Surgical History:   Procedure Laterality Date     NO HISTORY OF SURGERY       Medications:     No current facility-administered medications on file prior to encounter.   Current Outpatient Prescriptions on File Prior to Encounter:  ferrous fumarate 65 mg, Ak Chin. FE,-Vitamin C 125 mg (VITRON C)  MG TABS tablet Take 1 tablet by mouth daily   ibuprofen (ADVIL/MOTRIN) 800 MG tablet Take 1 tablet (800 mg) by mouth every 6 hours as needed for other (cramping)   acetaminophen (TYLENOL) 325 MG tablet Take 2 tablets (650 mg) by mouth every 4 hours as needed for mild pain or fever   Vitamin C    Allergies: No Known Allergies    Social History: Lives with Mom and 3 kids. Denies tobacco use, etOH, drug use.    Physical Exam:   Vitals:    10/04/18 1518 10/04/18 1520 10/04/18 1531 10/04/18 1535   BP: (!) 169/101 (!) 170/108 (!) 167/102 (!) 165/97   Resp:       Temp:        TempSrc:       SpO2:       Weight:          Gen: sitting up in bed, NAD  CV: rrr, nl s1 and s2, no m/r/g  Lungs: CTAB throughout posterior lung fields, no wheezes or crackles  Abdomen: soft, mild tenderness in RUQ, no epigastric tenderness.  Extremities: non-tender BLEs, no edema b/l    Labs:  Results for orders placed or performed during the hospital encounter of 10/04/18   US Abdomen Limited    Narrative    US ABDOMEN LIMITED 10/4/2018 4:25 PM    CLINICAL INFORMATION: Right upper quadrant pain.    COMPARISON: None.    FINDINGS:  Limited right upper quadrant ultrasound demonstrates numerous small  gallstones within a mildly distended gallbladder. No significant  gallbladder wall thickening or pericholecystic fluid. However, a  positive sonographic Damon's sign is reported by the sonographer. No  bile duct dilatation. Negative liver. Visualized portions of the   pancreas are negative. The visualized portion of the right kidney is  unremarkable. No hydronephrosis on the right.      Impression    IMPRESSION:   1. Cholelithiasis. No significant gallbladder wall thickening. However  gallbladder is mildly distended and there is a positive sonographic  Damon's sign which may indicate early cholecystitis.  2. No other acute findings.    PRASANTH WARNER MD   CBC with platelets differential   Result Value Ref Range    WBC 9.0 4.0 - 11.0 10e9/L    RBC Count 4.80 3.8 - 5.2 10e12/L    Hemoglobin 10.7 (L) 11.7 - 15.7 g/dL    Hematocrit 35.5 35.0 - 47.0 %    MCV 74 (L) 78 - 100 fl    MCH 22.3 (L) 26.5 - 33.0 pg    MCHC 30.1 (L) 31.5 - 36.5 g/dL    RDW 15.2 (H) 10.0 - 15.0 %    Platelet Count 368 150 - 450 10e9/L    Diff Method Automated Method     % Neutrophils 67.3 %    % Lymphocytes 26.3 %    % Monocytes 5.2 %    % Eosinophils 1.0 %    % Basophils 0.1 %    % Immature Granulocytes 0.1 %    Nucleated RBCs 0 0 /100    Absolute Neutrophil 6.1 1.6 - 8.3 10e9/L    Absolute Lymphocytes 2.4 0.8 - 5.3 10e9/L    Absolute Monocytes  0.5 0.0 - 1.3 10e9/L    Absolute Eosinophils 0.1 0.0 - 0.7 10e9/L    Absolute Basophils 0.0 0.0 - 0.2 10e9/L    Abs Immature Granulocytes 0.0 0 - 0.4 10e9/L    Absolute Nucleated RBC 0.0    Comprehensive metabolic panel   Result Value Ref Range    Sodium 142 133 - 144 mmol/L    Potassium 4.3 3.4 - 5.3 mmol/L    Chloride 108 94 - 109 mmol/L    Carbon Dioxide 26 20 - 32 mmol/L    Anion Gap 8 3 - 14 mmol/L    Glucose 82 70 - 99 mg/dL    Urea Nitrogen 10 7 - 30 mg/dL    Creatinine 0.55 0.52 - 1.04 mg/dL    GFR Estimate >90 >60 mL/min/1.7m2    GFR Estimate If Black >90 >60 mL/min/1.7m2    Calcium 8.6 8.5 - 10.1 mg/dL    Bilirubin Total 0.3 0.2 - 1.3 mg/dL    Albumin 3.1 (L) 3.4 - 5.0 g/dL    Protein Total 7.2 6.8 - 8.8 g/dL    Alkaline Phosphatase 121 40 - 150 U/L    ALT 31 0 - 50 U/L    AST 26 0 - 45 U/L   Lipase   Result Value Ref Range    Lipase 78 73 - 393 U/L   UA with Microscopic   Result Value Ref Range    Color Urine Yellow     Appearance Urine Clear     Glucose Urine Negative NEG^Negative mg/dL    Bilirubin Urine Negative NEG^Negative    Ketones Urine Negative NEG^Negative mg/dL    Specific Gravity Urine 1.020 1.003 - 1.035    Blood Urine Negative NEG^Negative    pH Urine 6.0 5.0 - 7.0 pH    Protein Albumin Urine 10 (A) NEG^Negative mg/dL    Urobilinogen mg/dL Normal 0.0 - 2.0 mg/dL    Nitrite Urine Negative NEG^Negative    Leukocyte Esterase Urine Large (A) NEG^Negative    Source Midstream Urine     WBC Urine 10 (H) 0 - 5 /HPF    RBC Urine 4 (H) 0 - 2 /HPF    Bacteria Urine Few (A) NEG^Negative /HPF    Squamous Epithelial /HPF Urine 7 (H) 0 - 1 /HPF    Mucous Urine Present (A) NEG^Negative /LPF    Amorphous Crystals Few (A) NEG^Negative /HPF   Protein  random urine with Creat Ratio   Result Value Ref Range    Protein Random Urine 0.22 g/L    Protein Total Urine g/gr Creatinine 0.18 0 - 0.2 g/g Cr   Creatinine urine calculation only   Result Value Ref Range    Creatinine Urine 122 mg/dL      A&P: Ms. Keyshawn is a  29yo  PPD#9 s/p  in pregnancy c/b cHTN, now w/ severe range BP and hemoconcentration concerning for superimposed preE w/ SF.    #Superimposed preE w/ SF  Admit to OB/Gyn  IV labetalol for severe range BPs. 20mg IV now, if still elevated in 10min give 40mg, etc per ACOG algorithm. If below 160/110 after 20mg IV labetalol can continue to monitor.  Start PO labetalol 100mg BID, titrate up PRN  IV mag sulfate to start, 4g LD followed by 2g/hr. Cont x 24hrs.    #Postpartum care:   - breast pump available PRN, pt may have baby come, aware needs 2nd adult to be w/ baby when she is on magnesium  - tylenol PRN  - ice packs, heat packs PRN  - reg diet  - senna BID PRN    D/w Dr Kumar, staff OB/Gyn    Gisela Sepulveda MD  OB/GYN PGY3  10/4/2018 4:30 PM     Staff MD Note    I appreciate the note by Dr. Sepulveda.  Any necessary changes have been made by me.  I evaluated the patient with the resident and agree with the assessment and plan.    Greer Kumar MD

## 2018-10-05 LAB
ALT SERPL W P-5'-P-CCNC: 30 U/L (ref 0–50)
AST SERPL W P-5'-P-CCNC: 22 U/L (ref 0–45)
CREAT SERPL-MCNC: 0.55 MG/DL (ref 0.52–1.04)
ERYTHROCYTE [DISTWIDTH] IN BLOOD BY AUTOMATED COUNT: 15.1 % (ref 10–15)
GFR SERPL CREATININE-BSD FRML MDRD: >90 ML/MIN/1.7M2
HCT VFR BLD AUTO: 35.7 % (ref 35–47)
HGB BLD-MCNC: 10.9 G/DL (ref 11.7–15.7)
MAGNESIUM SERPL-MCNC: 5.5 MG/DL (ref 1.6–2.3)
MCH RBC QN AUTO: 22.3 PG (ref 26.5–33)
MCHC RBC AUTO-ENTMCNC: 30.5 G/DL (ref 31.5–36.5)
MCV RBC AUTO: 73 FL (ref 78–100)
PLATELET # BLD AUTO: 383 10E9/L (ref 150–450)
RBC # BLD AUTO: 4.89 10E12/L (ref 3.8–5.2)
WBC # BLD AUTO: 7.5 10E9/L (ref 4–11)

## 2018-10-05 PROCEDURE — 84460 ALANINE AMINO (ALT) (SGPT): CPT | Performed by: STUDENT IN AN ORGANIZED HEALTH CARE EDUCATION/TRAINING PROGRAM

## 2018-10-05 PROCEDURE — 12000030 ZZH R&B OB INTERMEDIATE UMMC

## 2018-10-05 PROCEDURE — 85027 COMPLETE CBC AUTOMATED: CPT | Performed by: STUDENT IN AN ORGANIZED HEALTH CARE EDUCATION/TRAINING PROGRAM

## 2018-10-05 PROCEDURE — 83735 ASSAY OF MAGNESIUM: CPT | Performed by: STUDENT IN AN ORGANIZED HEALTH CARE EDUCATION/TRAINING PROGRAM

## 2018-10-05 PROCEDURE — 25000128 H RX IP 250 OP 636: Performed by: STUDENT IN AN ORGANIZED HEALTH CARE EDUCATION/TRAINING PROGRAM

## 2018-10-05 PROCEDURE — 84450 TRANSFERASE (AST) (SGOT): CPT | Performed by: STUDENT IN AN ORGANIZED HEALTH CARE EDUCATION/TRAINING PROGRAM

## 2018-10-05 PROCEDURE — 36415 COLL VENOUS BLD VENIPUNCTURE: CPT | Performed by: STUDENT IN AN ORGANIZED HEALTH CARE EDUCATION/TRAINING PROGRAM

## 2018-10-05 PROCEDURE — 82565 ASSAY OF CREATININE: CPT | Performed by: STUDENT IN AN ORGANIZED HEALTH CARE EDUCATION/TRAINING PROGRAM

## 2018-10-05 PROCEDURE — 25000132 ZZH RX MED GY IP 250 OP 250 PS 637: Performed by: STUDENT IN AN ORGANIZED HEALTH CARE EDUCATION/TRAINING PROGRAM

## 2018-10-05 RX ORDER — LABETALOL 200 MG/1
200 TABLET, FILM COATED ORAL EVERY 12 HOURS SCHEDULED
Status: DISCONTINUED | OUTPATIENT
Start: 2018-10-05 | End: 2018-10-06

## 2018-10-05 RX ORDER — LABETALOL 200 MG/1
200 TABLET, FILM COATED ORAL EVERY 12 HOURS
Qty: 60 TABLET | Refills: 0 | Status: CANCELLED | OUTPATIENT
Start: 2018-10-06 | End: 2018-11-05

## 2018-10-05 RX ORDER — METOCLOPRAMIDE 10 MG/1
10 TABLET ORAL EVERY 6 HOURS PRN
Status: DISCONTINUED | OUTPATIENT
Start: 2018-10-05 | End: 2018-10-06 | Stop reason: HOSPADM

## 2018-10-05 RX ORDER — DIPHENHYDRAMINE HCL 25 MG
25 CAPSULE ORAL EVERY 6 HOURS PRN
Status: DISCONTINUED | OUTPATIENT
Start: 2018-10-05 | End: 2018-10-06 | Stop reason: HOSPADM

## 2018-10-05 RX ADMIN — SENNOSIDES AND DOCUSATE SODIUM 2 TABLET: 8.6; 5 TABLET ORAL at 08:34

## 2018-10-05 RX ADMIN — ACETAMINOPHEN 650 MG: 325 TABLET, FILM COATED ORAL at 01:46

## 2018-10-05 RX ADMIN — ACETAMINOPHEN 650 MG: 325 TABLET, FILM COATED ORAL at 11:12

## 2018-10-05 RX ADMIN — METOCLOPRAMIDE 10 MG: 10 TABLET ORAL at 15:18

## 2018-10-05 RX ADMIN — DIPHENHYDRAMINE HYDROCHLORIDE 25 MG: 25 CAPSULE ORAL at 15:18

## 2018-10-05 RX ADMIN — ACETAMINOPHEN 650 MG: 325 TABLET, FILM COATED ORAL at 20:16

## 2018-10-05 RX ADMIN — MAGNESIUM SULFATE IN WATER 2 G/HR: 40 INJECTION, SOLUTION INTRAVENOUS at 16:48

## 2018-10-05 RX ADMIN — LABETALOL HCL 200 MG: 200 TABLET, FILM COATED ORAL at 20:16

## 2018-10-05 RX ADMIN — ACETAMINOPHEN 650 MG: 325 TABLET, FILM COATED ORAL at 06:35

## 2018-10-05 RX ADMIN — MAGNESIUM SULFATE IN WATER 2 G/HR: 40 INJECTION, SOLUTION INTRAVENOUS at 07:39

## 2018-10-05 RX ADMIN — LABETALOL HCL 200 MG: 200 TABLET, FILM COATED ORAL at 07:22

## 2018-10-05 RX ADMIN — DIPHENHYDRAMINE HYDROCHLORIDE 25 MG: 25 CAPSULE ORAL at 22:56

## 2018-10-05 RX ADMIN — SENNOSIDES AND DOCUSATE SODIUM 2 TABLET: 8.6; 5 TABLET ORAL at 20:16

## 2018-10-05 RX ADMIN — METOCLOPRAMIDE 10 MG: 10 TABLET ORAL at 09:38

## 2018-10-05 RX ADMIN — METOCLOPRAMIDE 10 MG: 10 TABLET ORAL at 22:56

## 2018-10-05 RX ADMIN — ACETAMINOPHEN 650 MG: 325 TABLET, FILM COATED ORAL at 16:46

## 2018-10-05 RX ADMIN — SODIUM CHLORIDE, POTASSIUM CHLORIDE, SODIUM LACTATE AND CALCIUM CHLORIDE 75 ML/HR: 600; 310; 30; 20 INJECTION, SOLUTION INTRAVENOUS at 09:00

## 2018-10-05 RX ADMIN — DIPHENHYDRAMINE HYDROCHLORIDE 25 MG: 25 CAPSULE ORAL at 08:34

## 2018-10-05 NOTE — PLAN OF CARE
Problem: Patient Care Overview  Goal: Plan of Care/Patient Progress Review  Outcome: No Change  VSS with BPs in the 130s/80-90s. Reports throbbing headache that improved with Tylenol, Reglan, and Benadryl. Denies all other s/sx of Pre-E. MgSO4 infusing at 2gm/hr and LR infusing at 75 ml/hr with plan for magnesium sulfate to be discontinued later this evening. Intake and output adequate. Pumping every few hours for baby who is here at bedside along with significant other. Bottles of breastmilk stored in the nursery fridge. EDS of 18; pt expressed interest in talking with someone on how she was feeling. Provider notified and SW consult placed. SW came to see pt briefly (see note) and will return at another time. Rest encouraged. Continue to monitor.

## 2018-10-05 NOTE — PROVIDER NOTIFICATION
Pt is complaining of constant throbbing headache that she has had since last evening, even prior to starting MgSO4. Rest and Tylenol are not helping. Is there any other medications that she can take for it? Thanks.

## 2018-10-05 NOTE — PLAN OF CARE
Problem: Patient Care Overview  Goal: Plan of Care/Patient Progress Review  Outcome: Improving  Patients blood pressures overnight have been borderline high. 145/102 at 0620 and 141/100 about 15 minutes later, Dr. Elizondo was notified and increased labetalol and wanted it given early. Patient states that she has had a headache throughout the whole night and tylenol does not seem to be helping. Declines dizziness, blurred vision, and epigastric pain. Has been voiding adequate amounts. Lungs are clear. Reflexes are +2/+3 with one beat of clonus in her left LE. Will continue to monitor blood pressures closely.

## 2018-10-05 NOTE — PROGRESS NOTES
Magnesium Check    S: Continues to have headache. Just received doses of benadryl and reglan. Denies blurry vision, SOB, CP, edema, N/V, RUQ or epigastric pain.    O:  Vitals:   Patient Vitals for the past 6 hrs:   BP Temp Pulse Resp SpO2   10/05/18 1235 (!) 137/97 - 81 18 100 %     General: AAOx3, NAD  CV: RRR, no murmurs, rubs, or gallops  Resp: CTAB, no wheezes, rales, or rhonchi  Ext: 2+ patellar and biceps reflexes, no clonus; no edema on bilateral LE    UOP: 242 mL/hr     A/P: Libby Mahajan is a 28 year old  on PPD#10 s/p , now HD#2 readmitted with preeclampsia with severe features.     # Preeclampsia with severe features  - BP: mildly elevated, but improving. On labetalol 200mg BID - will titrate as needed, adequate control currently  - UOP: adequate  - Symptoms: Headache, denies other symptoms. S/p benadryl and reglan. Monitor  - HELLP labs nml on admission, repeat normal this morning  - Mag sulfate for seizure prophylaxis: 4g ()>2g/hr. Mg lvl 5.5, adequate.   -continue Q4hr mag checks until d/c'd at 20:15 tonight    Jose Britton, MS3    Pt above seen with MS3 Tobi. No signs of mag toxicity. BP well controlled currently. Will monitor closely and titrate labetalol as needed. Mag off at 20:15 tonight. Pt tj notify if HA not improved with current regimen.    Gisela Sepulveda MD  PGY3  10/05/18 15:15

## 2018-10-05 NOTE — PROVIDER NOTIFICATION
FYI-BP was 136/91. DTR +2, with 1 beat of clonus on L foot. I will give her the Benadryl and reglan. Thanks.

## 2018-10-05 NOTE — PROGRESS NOTES
Obstetrics & Gynecology Service  Progress Note    S:   Continues to have a frontal headache and occasional dizziness. Denies vision changes/spots in vision, chest pain, shortness of breath, RUQ or epigastric pain.    O:  Patient Vitals for the past 8 hrs:   BP Temp Temp src Pulse Resp SpO2   10/05/18 0217 (!) 143/97 98.3  F (36.8  C) Oral - 16 96 %   10/04/18 2320 137/87 98.5  F (36.9  C) Oral 74 16 95 %   10/04/18 2225 134/84 97.8  F (36.6  C) Oral 75 16 97 %     Gen: Laying in bed, no distress, baby sleeping on patient  CV:  RRR, no murmurs  Pulm:  CTAB, no wheezes or crackles  Abd:  Soft, non-tender  Ext:  Patellar reflexes +2 b/l, no clonus b/l, no LE edema b/l    I/O:    Intake/Output Summary (Last 24 hours) at 10/05/18 0621  Last data filed at 10/04/18 2320   Gross per 24 hour   Intake           883.33 ml   Output              750 ml   Net           133.33 ml   Patient urinated another 700 mL after midnight that is not yet charted.     A/P:  Libby Mahajan is a 28 year old  on PPD#10 s/p , now HD#2 readmitted with preeclampsia with severe features.    Preeclampsia with severe features   - BP: Normotensive to mild range over the past 4 hours   - UOP: adequate UOP   - Symptoms: Headache, denies other symptoms   - HELLP labs nml on admission, repeat ordered for this AM   - Mag sulfate for seizure prophylaxis: 4g ()>2g/hr   - Next clinical mag check at 1000   - D#2 labetalol 100 mg BID, consider increasing this if BP increases    Postpartum:  - Routine postpartum care, senna and tylenol ordered. Baby at bedside.      Sandy Elizondo MD  Obstetrics and Gynecology, PGY-3    Appreciate note by Dr. Elizondo. Patient has been seen and examined by me separate from the resident, agree with above note. Now on 200mg BID labetalol with mild range BP. Still has headache, just received benadryl. WIll monitor closely.     Adrianne Stephens MD  9:27 AM

## 2018-10-05 NOTE — ED PROVIDER NOTES
History     Chief Complaint   Patient presents with     Hypertension     States [facility] for [facility]t. Vaginal delivery . BP high in clinic and sent here. BP was high off and on in pregnancy. Denies headache.      Patient is a 28 year old female presenting with hypertension.   Hypertension   Associated symptoms: abdominal pain    Associated symptoms: no chest pain, no fever, no headaches, no nausea, no shortness of breath and not vomiting      Libby Mahajan is a 28 year old  003 female who is postpartum day 9 status post normal spontaneous vaginal delivery and presents from the midwife clinic due to elevated blood pressures.  Patient reports just prior to delivery she was noted to be more hypertensive.  She does have a history of chronic hypertension and had previously been on labetalol however is not currently on any antihypertensives.  She was following up as scheduled in clinic today for repeat blood pressure check and was noted to be hypertensive with blood pressures in the 160s over 100s.  Patient denies any headache, vision changes, nausea, vomiting, chest pain, shortness of breath, abdominal pain, pelvic pain or lower extremity swelling.  She has not had any changes in urine output.  She denies any significant ongoing vaginal bleeding.  Patient reports she is not taking any anti-hypertensives.  She does report she has known cholelithiasis and has had pain in the right upper quadrant to palpation for a few months now.  She is scheduled to follow-up in clinic with surgery for elective cholecystectomy.    I have reviewed the Medications, Allergies, Past Medical and Surgical History, and Social History in the Epic system.    Review of Systems   Constitutional: Negative for fever.   Respiratory: Negative for cough and shortness of breath.    Cardiovascular: Negative for chest pain and leg swelling.   Gastrointestinal: Positive for abdominal pain. Negative for nausea and  vomiting.   Genitourinary: Positive for vaginal bleeding. Negative for dysuria.   Neurological: Negative for headaches.   All other systems reviewed and are negative.      Physical Exam   BP: 148/89  Heart Rate: 62  Temp: 97  F (36.1  C)  Resp: 16  Weight: 76.7 kg (169 lb)  SpO2: 98 %      Physical Exam  General: patient is alert and oriented and in no acute distress   Head: atraumatic and normocephalic   EENT: moist mucus membranes without tonsillar erythema or exudates, pupils round and reactive, sclerae anicteric  Neck: supple   Cardiovascular: regular rate and rhythm, extremities warm and well perfused, no lower extremity edema  Pulmonary: lungs clear to auscultation bilaterally   Abdomen: soft, mild tenderness to palpation in the right upper quadrant without guarding, nondistended, no CVA tenderness, no pelvic tenderness to palpation  Musculoskeletal: normal range of motion   Neurological: alert and oriented, moving all extremities symmetrically, gait normal   Skin: warm, dry     ED Course     ED Course     Procedures             Critical Care time:  none             Labs Ordered and Resulted from Time of ED Arrival Up to the Time of Departure from the ED   CBC WITH PLATELETS DIFFERENTIAL - Abnormal; Notable for the following:        Result Value    Hemoglobin 10.7 (*)     MCV 74 (*)     MCH 22.3 (*)     MCHC 30.1 (*)     RDW 15.2 (*)     All other components within normal limits   COMPREHENSIVE METABOLIC PANEL - Abnormal; Notable for the following:     Albumin 3.1 (*)     All other components within normal limits   ROUTINE UA WITH MICROSCOPIC - Abnormal; Notable for the following:     Protein Albumin Urine 10 (*)     Leukocyte Esterase Urine Large (*)     WBC Urine 10 (*)     RBC Urine 4 (*)     Bacteria Urine Few (*)     Squamous Epithelial /HPF Urine 7 (*)     Mucous Urine Present (*)     Amorphous Crystals Few (*)     All other components within normal limits   LIPASE   PROTEIN  RANDOM URINE   CREATININE  URINE CALCULATION ONLY   IP ASSIGN PROVIDER TEAM TO TREATMENT TEAM            Assessments & Plan (with Medical Decision Making)   Ms. Mahajan is a 28 year old  003 female who is postpartum day 9 status post normal spontaneous vaginal delivery and presents from the midwife clinic due to elevated blood pressures.  In the emergency department her blood pressures continue to be quite elevated 160-170/100-110.  This is in the setting of chronic hypertension.  She is otherwise asymptomatic with exception of right upper quadrant pain however this is not a new change for her.  An ultrasound was obtained which does show cholelithiasis without significant wall thickening or evidence of obstruction.  Labs were obtained which show no elevation in LFTs.  Hemoglobin is stable at 10.7 and platelets are within normal limits.  Urine protein to creatinine ratio is within normal limits at 0.18.  Discussed with OB GYN and recommended initiating IV labetalol.  She was given 20 mg IV labetalol with improvement in her blood pressures.  Patient will be admitted to OB/GYN for further treatment of postpartum preeclampsia.    I have reviewed the nursing notes.    I have reviewed the findings, diagnosis, plan and need for follow up with the patient.    New Prescriptions    No medications on file       Final diagnoses:   Pre-eclampsia in postpartum period       10/4/2018   [facility], [place], EMERGENCY DEPARTMENT     Su Peterson MD  10/04/18 1927

## 2018-10-05 NOTE — PROGRESS NOTES
Obstetrics & Gynecology Service  Magnesium Check Note    S:   Patient is fatigued. Has a frontal headache described as sharp. Also feeling mildly dizzy. Denies vision changes/spots in vision, chest pain, shortness of breath, RUQ or epigastric pain.    O:  Patient Vitals for the past 4 hrs:   BP Temp Temp src Pulse Resp SpO2   10/04/18 2320 137/87 98.5  F (36.9  C) Oral 74 16 95 %   10/04/18 2225 134/84 97.8  F (36.6  C) Oral 75 16 97 %   10/04/18 2154 (!) 143/93 98.8  F (37.1  C) Oral 72 16 97 %     Gen: Laying in bed, no distress  CV:  RRR, no murmurs  Pulm:  CTAB, no wheezes or crackles  Abd:  Soft, non-tender  Ext:  Patellar reflexes +2 b/l, no clonus b/l, no LE edema b/l    I/O:    Intake/Output Summary (Last 24 hours) at 10/05/18 0144  Last data filed at 10/04/18 2320   Gross per 24 hour   Intake           883.33 ml   Output              750 ml   Net           133.33 ml     A/P:  Libby Mahajan is a 28 year old  on PPD#10 s/p , now HD#2 readmitted with preeclampsia with severe features.    Preeclampsia with severe features   - BP: Normotensive to mild range over the past 4 hours   - UOP: feeling as if she has to urinate again, adequate UOP   - Symptoms: Headache, denies other symptoms   - HELLP labs nml on admission   - Mag sulfate for seizure prophylaxis: 4g ()>2g/hr   - Next clinical mag check at 0530   - D#2 labetalol 100 mg BID, consider increasing this if BP increases    Postpartum:  - Routine postpartum care, senna and tylenol ordered. Baby at bedside.      Sandy Elizondo MD  Obstetrics and Gynecology, PGY-3

## 2018-10-05 NOTE — DISCHARGE SUMMARY
[facility] Discharge Summary    Libby Mahajan MRN# [id number]   Age: 28 year old YOB: 1990     Date of Admission:  10/4/2018  Date of Discharge:  10/06/2018    Admitting Physician:  Greer Kumar MD  Discharge Physician:  Heide Robb MD    Admit Dx:   -  PPD#9 s/p   - Superimposed preeclampsia with severe features    Discharge Dx:  -    - Same as above    Procedures:  None    Admit HPI/Labor Course:  Libby Mahajan is a 28 year old  PPD#9 from pregnancy c/b cHTN (no meds) who presents for elevated BP noted at CNM visit today. Pt reports she was started on labetalol at 8wks GA then stopped mid pregnancy when BP was normal. Towards the end of this pregnancy her BP started to elevate again but never restarted medications. She has been feeling well postpartum, no symptoms of preE and bleeding and cramping improving daily. She just had BP in the 160-170 systolic range in clinic today and so was sent in by her CNM.  She has a h/o of gHTN with last pregnancy, never had preeclampsia before.    Please see her Admission H&P and Delivery Summary for further details.    Hospital Course:   Libby was admitted for blood pressure treatment and magnesium. She was given 20mg of IV labetalol in the ED for severe sustained BP. She was maintained on IV magnesium for seizure prophylaxis for 24 hours. She was started on 100 mg labetalol PO BID on admission which was increased to 200 BID on HD#2, and TID on HD#3.  Nifedipine 30mg daily was also added at that time. HELLP labs were WNL on admission. UPC was 0.18.    Discharge Medications:     Review of your medicines      START taking       Dose / Directions    labetalol 200 MG tablet   Commonly known as:  NORMODYNE   Used for:  Pre-eclampsia in postpartum period        Dose:  200 mg   Take 1 tablet (200 mg) by mouth 3 times daily   Quantity:  90 tablet   Refills:  1       NIFEdipine ER 30 MG Tb24   Commonly known  as:  ADALAT CC   Used for:  Pre-eclampsia in postpartum period        Dose:  30 mg   Start taking on:  10/7/2018   Take 1 tablet (30 mg) by mouth daily   Quantity:  30 tablet   Refills:  1         CONTINUE these medicines which have NOT CHANGED       Dose / Directions    acetaminophen 325 MG tablet   Commonly known as:  TYLENOL   Used for:   (normal spontaneous vaginal delivery)        Dose:  650 mg   Take 2 tablets (650 mg) by mouth every 4 hours as needed for mild pain or fever   Quantity:  100 tablet   Refills:  0       ferrous fumarate 65 mg ([place]. FE)-Vitamin C 125 mg  MG Tabs tablet   Commonly known as:  VITRON C   Used for:  Postpartum anemia        Dose:  1 tablet   Take 1 tablet by mouth daily   Quantity:  60 tablet   Refills:  3       ibuprofen 800 MG tablet   Commonly known as:  ADVIL/MOTRIN   Used for:   (normal spontaneous vaginal delivery)        Dose:  800 mg   Take 1 tablet (800 mg) by mouth every 6 hours as needed for other (cramping)   Quantity:  90 tablet   Refills:  0            Where to get your medicines      Some of these will need a paper prescription and others can be bought over the counter. Ask your nurse if you have questions.     Bring a paper prescription for each of these medications      labetalol 200 MG tablet     NIFEdipine ER 30 MG Tb24             Discharge/Disposition:  Libby Mahajan was discharged to home in stable condition with the following instructions/medications:  1) Return for further evaluation if severe headache, vision changes, shortness of breath, chest pain.   2) She was instructed to follow-up with her primary OB on in two days, in 1 week, and again in 6 weeks.    Nicole Stoddard MD  PGY-3 OB/GYN  [phone] (OB G3 Pager) / [phone] (OB G2 Pager)    The patient was seen and examined by me on the day of discharge.  I have reviewed and agree with the above note.    Heide Robb MD, FACOG

## 2018-10-05 NOTE — PLAN OF CARE
Problem: Patient Care Overview  Goal: Plan of Care/Patient Progress Review  Outcome: No Change  Pt admitted to room 7130 at 2000 via wheelchair, brought to room by ER staff. Magnesium loading dose infused and started on continuous magnesium 2gm/hr, cosigned by 2nd nurse. Vitals taken per orders and stable, please see medication flow sheet and vitals flow sheet. Reflexes normal and no clonus, pt did complain of a headache but no other symptoms stated, Tylenol given and resolved. Strict I&O started, pt able to ambulate to the bathroom independently and void without difficulty. Pumping initiated since infant was not here yet, teaching done. Pt stated she has feelings of depression and overwhelmed, will have her complete another edinburgh depression scale, sticky note put in for provider.   Will continue to monitor pt status and cares.

## 2018-10-05 NOTE — PROVIDER NOTIFICATION
10/05/18 0650   Provider Notification   Provider Name/Title Dr. Elizondo    Method of Notification In Department   Notification Reason Vital Signs Change  (increased blood pressures)   Talked face to face to Dr. Elizondo about patients blood pressures of 145/102 and 141/100 15 minutes later. Dr. Elizondo states that she is going to increase the dose of Jennisha's labetalol and Dr. Elizondo would like it given early.

## 2018-10-05 NOTE — PROGRESS NOTES
Magnesium Check    S: Continues to have headache. Benadryl and reglan helped a bit. Denies blurry vision, SOB, N/V, RUQ or epigastric pain.    O:  Vitals:   BP (!) 136/91  Pulse 74  Temp 98.5  F (36.9  C) (Oral)  Resp 18  Wt 75.9 kg (167 lb 4.8 oz)  LMP 2017  SpO2 98%  BMI 27.84 kg/m2  UOP: 540cc since 0700    General: AAOx3, NAD  CV: RRR, no murmurs, rubs, or gallops  Resp: CTAB, no wheezes, rales, or rhonchi  Ext: 1+ patellar reflex, no clonus; no edema on bilateral LE    Labs:  Repeat HELLP labs normal    A/P: Libby Mahajan is a 28 year old  on PPD#10 s/p , now HD#2 readmitted with preeclampsia with severe features.     # Preeclampsia with severe features  - BP: Normotensive to mild range over the past 4 hours  - UOP: adequate UOP  - Symptoms: Headache, denies other symptoms. S/p benadryl and reglan.  - HELLP labs nml on admission, repeat normal this morning  - Mag sulfate for seizure prophylaxis: 4g ()>2g/hr. Mg lvl 5.5, adequate.  - D#2 labetalol 100 mg BID, consider increasing this if BP increases      Baron Murcia MD  OB/GYN PGY1

## 2018-10-05 NOTE — PROGRESS NOTES
[facility]  MATERNAL CHILD HEALTH   SOCIAL WORK PROGRESS NOTE      DATA:     SW consulted to meet with pt per high [place] Scores and readmit 10 days post-delivery. Per chart review, [place]  Depression score was significant for difficulty coping, difficulty sleeping, sadness/feeling miserable, uncontrollable crying.  Mom reports having no suicidal ideation.    INTERVENTION:     SW attempted to meet with Mom at bedside; she is asleep and requests SW to return at another time.    ASSESSMENT:     Mom amenable to SW visit, per conversation with charge RN. Mom also verbalizes this to writer.    PLAN:     SW to visit with Mom at another time. Will continue to follow for supportive intervention and to assess for continued needs.    ALEJANDRA Carmichael, [facility]   Social Worker  Maternal Child Health  [facility]  Direct: [phone]  Pager: [phone]

## 2018-10-05 NOTE — PHARMACY-ADMISSION MEDICATION HISTORY
Admission Medication History status for the 10/4/2018 admission is complete.  See EPIC admission navigator for Prior to Admission medications.    Medication history sources:  Patient, [place]     Medication history source reliability: Good    Medication adherence:  Good    Changes made to PTA medication list (reason)  Added: None    Deleted: None    Changed: None    Additional medication history information (including reliability of information, actions taken by pharmacist): Took PRN APAP today    [place]  - No results with available information    Time spent in this activity: 5 mins    Medication history completed by: Jose Camp Pharm D 10/4/2018 7:04 PM    Prior to Admission medications    Medication Sig Last Dose Taking? Auth Provider   acetaminophen (TYLENOL) 325 MG tablet Take 2 tablets (650 mg) by mouth every 4 hours as needed for mild pain or fever 10/4/2018 at Unknown time Yes Ya Figueroa APRN CNM   ferrous fumarate 65 mg, [place]. FE,-Vitamin C 125 mg (VITRON C)  MG TABS tablet Take 1 tablet by mouth daily 10/4/2018 at Unknown time Yes Ya Figueroa APRN CNM   ibuprofen (ADVIL/MOTRIN) 800 MG tablet Take 1 tablet (800 mg) by mouth every 6 hours as needed for other (cramping) 10/4/2018 at Unknown time Yes Cinthya Jiang APRN CNM

## 2018-10-06 VITALS
HEART RATE: 56 BPM | SYSTOLIC BLOOD PRESSURE: 139 MMHG | DIASTOLIC BLOOD PRESSURE: 92 MMHG | TEMPERATURE: 99.3 F | OXYGEN SATURATION: 95 % | WEIGHT: 166.67 LBS | RESPIRATION RATE: 18 BRPM | BODY MASS INDEX: 27.73 KG/M2

## 2018-10-06 PROCEDURE — 25000132 ZZH RX MED GY IP 250 OP 250 PS 637: Performed by: STUDENT IN AN ORGANIZED HEALTH CARE EDUCATION/TRAINING PROGRAM

## 2018-10-06 PROCEDURE — 25000132 ZZH RX MED GY IP 250 OP 250 PS 637: Performed by: OBSTETRICS & GYNECOLOGY

## 2018-10-06 RX ORDER — LABETALOL 200 MG/1
200 TABLET, FILM COATED ORAL EVERY 12 HOURS
Qty: 60 TABLET | Refills: 0 | Status: SHIPPED | OUTPATIENT
Start: 2018-10-06 | End: 2018-10-06

## 2018-10-06 RX ORDER — LABETALOL 200 MG/1
200 TABLET, FILM COATED ORAL EVERY 12 HOURS SCHEDULED
Status: DISCONTINUED | OUTPATIENT
Start: 2018-10-06 | End: 2018-10-06 | Stop reason: HOSPADM

## 2018-10-06 RX ORDER — NIFEDIPINE 30 MG
30 TABLET, EXTENDED RELEASE ORAL DAILY
Qty: 30 TABLET | Refills: 1 | Status: SHIPPED | OUTPATIENT
Start: 2018-10-07 | End: 2018-12-04

## 2018-10-06 RX ORDER — NIFEDIPINE 30 MG/1
30 TABLET, EXTENDED RELEASE ORAL DAILY
Status: DISCONTINUED | OUTPATIENT
Start: 2018-10-06 | End: 2018-10-06 | Stop reason: HOSPADM

## 2018-10-06 RX ORDER — LABETALOL 200 MG/1
200 TABLET, FILM COATED ORAL EVERY 8 HOURS SCHEDULED
Status: DISCONTINUED | OUTPATIENT
Start: 2018-10-06 | End: 2018-10-06

## 2018-10-06 RX ORDER — POLYETHYLENE GLYCOL 3350 17 G/17G
17 POWDER, FOR SOLUTION ORAL DAILY
Status: DISCONTINUED | OUTPATIENT
Start: 2018-10-06 | End: 2018-10-06 | Stop reason: HOSPADM

## 2018-10-06 RX ORDER — NIFEDIPINE 30 MG
30 TABLET, EXTENDED RELEASE ORAL DAILY
Qty: 30 TABLET | Refills: 0 | Status: SHIPPED | OUTPATIENT
Start: 2018-10-07 | End: 2018-10-06

## 2018-10-06 RX ORDER — LABETALOL 200 MG/1
200 TABLET, FILM COATED ORAL 3 TIMES DAILY
Qty: 90 TABLET | Refills: 1 | Status: SHIPPED | OUTPATIENT
Start: 2018-10-06 | End: 2018-12-04

## 2018-10-06 RX ADMIN — POLYETHYLENE GLYCOL 3350 17 G: 17 POWDER, FOR SOLUTION ORAL at 09:28

## 2018-10-06 RX ADMIN — SENNOSIDES AND DOCUSATE SODIUM 2 TABLET: 8.6; 5 TABLET ORAL at 08:11

## 2018-10-06 RX ADMIN — LABETALOL HCL 200 MG: 200 TABLET, FILM COATED ORAL at 08:11

## 2018-10-06 RX ADMIN — NIFEDIPINE 30 MG: 30 TABLET, FILM COATED, EXTENDED RELEASE ORAL at 10:54

## 2018-10-06 RX ADMIN — ACETAMINOPHEN 650 MG: 325 TABLET, FILM COATED ORAL at 13:48

## 2018-10-06 RX ADMIN — ACETAMINOPHEN 650 MG: 325 TABLET, FILM COATED ORAL at 09:28

## 2018-10-06 RX ADMIN — ACETAMINOPHEN 650 MG: 325 TABLET, FILM COATED ORAL at 00:20

## 2018-10-06 RX ADMIN — ACETAMINOPHEN 650 MG: 325 TABLET, FILM COATED ORAL at 04:54

## 2018-10-06 RX ADMIN — LABETALOL HCL 200 MG: 200 TABLET, FILM COATED ORAL at 20:27

## 2018-10-06 NOTE — PROVIDER NOTIFICATION
Pt wants to discharge to home this evening. Talked with Dr. Robb via phone and she said as long as Labetalol is three times daily and pt follows up in clinic on Mon. She wants you to place discharge orders. Thanks.

## 2018-10-06 NOTE — PROGRESS NOTES
[facility]   Ob/Gyn Progress Note    Name:  Libby Mahajan  MRN: [id number]    S: Patient doing well, no acute events overnight. Denies headache, chest pain, SOB, nausea, vomiting, RUQ pain, or swelling. Her partner is going home today and she is eager to discharge soon if possible.    O:   Patient Vitals for the past 24 hrs:   BP Temp Temp src Pulse Resp SpO2   10/06/18 0532 (!) 157/93 - - - - -   10/06/18 0515 (!) 160/96 - - - - -   10/06/18 0511 - 98.7  F (37.1  C) Oral 56 20 95 %   10/06/18 0020 (!) 155/97 98.5  F (36.9  C) Oral 56 20 95 %   10/05/18 2014 (!) 130/95 98.4  F (36.9  C) Oral 88 20 95 %   10/05/18 1637 133/86 98.7  F (37.1  C) Oral 86 18 98 %   10/05/18 1235 (!) 137/97 - - 81 18 100 %   10/05/18 0821 (!) 136/91 98.5  F (36.9  C) Oral 82 18 98 %     Weight 76.6kg (10/4) > 75.9kg (10/5)    Gen:  Resting comfortably, NAD  CV:  RRR, no murmur  Pulm:  CTAB, no wheezes  Abd:  Soft, nontender, non-distended.Fundus below umbilicus, firm and non-tender.  Ext:  non-tender, trace LE edema b/l    I/O last 3 completed shifts:  In: 5913.08 [P.O.:2410; I.V.:3503.08]  Out: 3640 [Urine:3640]       Assessment/Plan:  28 year old  on PPD #11 s/p , HD#3 for superimposed preeclampsia with severe features.    Superimposed PreE w/ SF:  - s/p 24hrs magnesium, 20mg IV labetalol once on day of admission  - BP mild range overnight, uptrending with one nonsustained severe range pressure at 0515. Pulse 56 this morning.  - On D#2 labetalol 200 BID (up from 100mg BID on day of admit), consider adding procardia today if BP remain elevated given low heart rate  - UOP adequate, 100cc/hr overnight. Weight downtrending  - HELLP labs normal, UPC 0.18. Repeat prn for severe range BP or new symptoms  - Continue to monitor BPs, UOP, daily weights    Chronic RUQ pain:  - Known gallstones  - Stable to improving on exam    Dispo: discharge home pending adequate blood pressure control, with BP recheck  within one week of discharge    Christi David MD  Ob/Gyn Resident, PGY-1  Pager: [phone]  10/06/18 8:00 AM     The patient was seen and examined by me separately from the team.  I have reviewed and agree with the above note.  She is doing well this morning, would like to go home today.   is traveling back to the [place] where he currently still lives.  Discussed adding nifedipine XL 30 mg this morning and monitoring her blood pressure through the day with possible discharge later today.  She was agreeable to this plan.    Heide Robb MD, FACOG

## 2018-10-06 NOTE — CONSULTS
"[facility]\""  MATERNAL CHILD HEALTH   SOCIAL WORK PROGRESS NOTE    DATA:     Presenting Information: Pt is Libby, 27 y/o, a . She was readmitted due to preeclampsia with severe features on post partum day #10.     Libby's  daughter, Conchita.     Living Situation/ Family Constellation:: Libby moved from [place] one month ago. is currently living with her mother, cousin and 3 children (9 female, 2.5 yr male,  female) in [place].   Elizabeth's , Jurgen, continues to reside in the [place]. Timing of him relocating to join family is unknown. He is the father to the 2.5 yr old son as well.     Social Support: Extended family of Libby's mom and cousins are a strong support for her. Jurgen states his family is also here.     Finances and Insurance: Currently receiving support from family.     Mental/Chemical Health History and Current Coping: Parents do not report a history of mental health issues. Libby reports \"things are harder this time.\"     [place]  Depression Scale score of 18. Symptomology including \"difficulty coping, sleeping, sadness/feeling miserable, uncontrollable crying.\"   Life stressors of  not being physically present, recent residence change, and limited support are adding to Libby's ability to manage. Libby admits she is concerned with her ability to function and is receptive to referral to Pregnancy and Post Partum Support [place] ([facility]).     She is receptive to SW reaching out to [facility] via email and phoning/emailing her once referrals are received by SW. She confirms that she will be able to initiate contact with a provider once she receives a referral.     Transportation: She states her cousins are able to provide transportation for her depending on their availability.     Community Resources//Baby Supplies: No baby needs identified.     INTERVENTION:     Chart review. [facility] " was consulted for this pt due to an [place]  Depression Scale score of 18.   SW met with pt and her spouse in her [facility] room to assess for needs.     Social work reviewed pt's EPDS sheet, her history of symptoms, and reviewed how she was currently coping. Provided psychoeducation on  mood disorders and the differences between PMADs and impact of life circumstances. Assessed whether pt was interested in additional mental health support at this time. Pt would appreciate a referral from [facility]. Emailed [facility] for referral.     Addressed community mental health resource needs.  provided a postcard for Pregnancy and Postpartum Support [place] ([facility]) and educated both parents on how they can access help connecting with mental health supports in their community if needs arise.]    Assessed for family and community supports.   Provided supportive counseling.   Provided  business card.   No other needs identified by the family.     ASSESSMENT:     Coping: poor, feeling unable to manage symptoms on her own.     Affect:  Flat, obtained eye contact throughout SW visit.     Assessment of parental risk for PMAD: significant due to medical complications, limited support system, stress with long distance marriage, new change of residency.     Motivation/Ability to Access Services: Limited ability to access services due to relying on transportation from cousin, being sole parent to other children, lack of knowledge of community resources.     Assessment of Support System stable,involved, limited, adequate.     Family interactions; Libby was breastfeeding initially and then holding to put Conchita to sleep. Jurgen sat in a chair near by. He engaged minimally directly with Libby or Conchita during SW visit.   Libby verbalizes that she will miss her spouse's presence. The dynamics of the relationship are unknown as are the reasons for Libby to have made this recent move and uncertainty of when Jurgen  will be moving here.     Identified Barriers minimal knowledge of local community resources, relying on family support, long distance relationship with spouse.      Level of engagement with SW Parents did not offer details of their circumstances or identify specific needs.     Social Work summary of primary risk factors identified Libby seems motivated and interested in accessing additional emotional and mental health support.     PLAN:     SW to follow up with pt via phone and/or email when referrals are received from PPS[facility].       Kjerstin Rydeen, [facility]   Social Worker  Maternal Child Health   Direct: [phone]  Pager: [phone]

## 2018-10-06 NOTE — PLAN OF CARE
Problem: Patient Care Overview  Goal: Plan of Care/Patient Progress Review  Outcome: No Change  Blood pressure elevated, other vitals stable. Did not meet parameters for PRN Labetolol usage. C/o headache on and off throughout the shift, PRNs given. Denies epigastric pain, visual changes, and edema. Magnesium infusion completed at 2014. Reflexes +2-+3, no clonus noted. I/O adequate. Continue to monitor vital signs and symptoms of pre-eclampsia.

## 2018-10-06 NOTE — PROGRESS NOTES
Magnesium Check    S: Has mild HA, not worsening. Denies blurry vision, SOB, CP, edema, N/V, RUQ or epigastric pain. Swelling stable.     O:  Vitals:   Patient Vitals for the past 6 hrs:   BP Temp Temp src Pulse Resp SpO2   10/05/18 1637 133/86 98.7  F (37.1  C) Oral 86 18 98 %     General: AAOx3, NAD  CV: Regular rate  Resp: Breathing comfortably on RA  Ext: 2+ patellar reflexes, no edema on bilateral LE    UOP: 250 mL/hr     A/P: Libby Mahajan is a 28 year old  on PPD#10 s/p , now HD#2 readmitted with preeclampsia with severe features.     Preeclampsia with severe features  - BP: mildly elevated, but improving. On labetalol 200mg BID - will titrate as needed, adequate control currently  - UOP: adequate  - Symptoms: Headache, denies other symptoms. S/p benadryl and reglan. Monitor  - HELLP labs nml on admission, repeat normal this morning  - Mag sulfate for seizure prophylaxis: 4g ()>2g/hr. Mg lvl 5.5, adequate.   -continue Q4hr mag checks until d/c'd at 20:15 xi Reyes MD, [facility]  Ob/Gyn PGY3  10/05/18

## 2018-10-06 NOTE — PROVIDER NOTIFICATION
Pt really wants to discharge home this evening. Her BPs today have been 130-150s/90-100s. Please let me know.

## 2018-10-06 NOTE — PLAN OF CARE
Please send rx for the labetalol three times day to inpatient pharmacy as discharge pharmacy is already closed. Thanks.

## 2018-10-06 NOTE — PROVIDER NOTIFICATION
FYI-Pt had a BP this AM at 0515 of 160/96 and 157/92 15 min later. BP at 0811 was 142/99 and pt was given AM dose of Labetalol. Denies s/sx of Pre-E.    Also, pt reports no BM since 09/26/18. Passing gas. Soft abdomen. Active bowel sounds. No discomfort. Been taking 2 tab of stool softener twice daily.

## 2018-10-06 NOTE — PROVIDER NOTIFICATION
Pt's BP at 1200 was 158/103 and at 1600 was 139/92. Pt would like to go home if possible this evening.  [place] and she doesn't have anyone to come be with baby.

## 2018-10-06 NOTE — PLAN OF CARE
Problem: Patient Care Overview  Goal: Plan of Care/Patient Progress Review  Outcome: No Change  VSS, except for elevated BPs in the 140-150s/90-100s. Denies s/sx of Pre-E and has not complained of a headache today. DTR+2, no clonus present. Received dose of Nifedipine at 1054. Pt reported last BM was 9/26/2018; provider notified and ordered miralax. Did have a BM this AM. Baby and  at bedside. Seen by SW today. Was teary at 1345 because her  left this afternoon and she is stressed about providing cares for her children. Pt is interested in discharging to home this evening. Will do another BP check this evening and let the provider know.

## 2018-10-07 NOTE — DISCHARGE INSTRUCTIONS
Preeclampsia   Call your doctor right away if you have any of the following:  - Edema (swelling) in your face or hands  - Rapid weight gain-about 1 pound or more in a day  - Headache  - Abdominal pain on your right side  - Vision problems (flashes or spots)  - You have questions or concerns once you return home.

## 2018-10-07 NOTE — PLAN OF CARE
Discharged to home at 2035. Denies symptoms of Pre-E. AVS printed and reviewed, education completed, BP medications filled and given to patient. Breast milk returned returned and verified from breast milk fridge. Will follow up in clinic on Monday, or will notify provider if experiencing symptoms before then.

## 2018-10-08 ENCOUNTER — ALLIED HEALTH/NURSE VISIT (OUTPATIENT)
Dept: OBGYN | Facility: CLINIC | Age: 28
End: 2018-10-08
Payer: MEDICAID

## 2018-10-08 VITALS — DIASTOLIC BLOOD PRESSURE: 78 MMHG | HEART RATE: 91 BPM | SYSTOLIC BLOOD PRESSURE: 112 MMHG

## 2018-10-08 DIAGNOSIS — Z86.79 HISTORY OF CHRONIC HYPERTENSION: Primary | ICD-10-CM

## 2018-10-08 NOTE — NURSING NOTE
Chief Complaint   Patient presents with     Allied Health Visit     B/P check   Karen Guerrero LPN

## 2018-10-08 NOTE — MR AVS SNAPSHOT
After Visit Summary   10/8/2018    Libby Mahajan    MRN: [id number]           Patient Information     Date Of Birth          1990        Visit Information        Provider Department      10/8/2018 2:00 PM Nurse, [facility]        Today's Diagnoses     History of chronic hypertension    -  1       Follow-ups after your visit        Your next 10 appointments already scheduled     Nov 07, 2018  2:00 PM CST   RETURN EXTENDED with Maricarmen Siu MD   [facility] ([facility])    [facility] [address]  [address]  [facility] [address] [phone]              Who to contact     Please call your clinic at [phone] to:    Ask questions about your health    Make or cancel appointments    Discuss your medicines    Learn about your test results    Speak to your doctor            Additional Information About Your Visit        Care EveryWhere ID     This is your Care EveryWhere ID. This could be used by other organizations to access your [place] medical records  [id number]        Your Vitals Were     Pulse Last Period                91 12/27/2017           Blood Pressure from Last 3 Encounters:   No data found for BP    Weight from Last 3 Encounters:   No data found for Wt              Today, you had the following     No orders found for display       Primary Care Provider Office Phone # Fax #    [facility] [phone]       [address]  [facility] [address]        Equal Access to Services     GAGE FIGUEROA : Hadii vipul fitzgerald hadasho Solucyali, waaxda luqadaha, qaybta kaalmada adeegyada, ladi good. So [facility] [phone].    ATENCIÓN: Si habla español, tiene a bhat disposición servicios gratuitos de asistencia lingüística. Llame al [phone].    We comply with applicable federal civil rights laws and [place] laws. We do not  discriminate on the basis of race, color, national origin, age, disability, sex, sexual orientation, or gender identity.            Thank you!     Thank you for choosing [facility]  for your care. Our goal is always to provide you with excellent care. Hearing back from our patients is one way we can continue to improve our services. Please take a few minutes to complete the written survey that you may receive in the mail after your visit with us. Thank you!             Your Updated Medication List - Protect others around you: Learn how to safely use, store and throw away your medicines at www.disposemymeds.org.          This list is accurate as of 10/8/18 11:59 PM.  Always use your most recent med list.                   Brand Name Dispense Instructions for use Diagnosis    acetaminophen 325 MG tablet    TYLENOL    100 tablet    Take 2 tablets (650 mg) by mouth every 4 hours as needed for mild pain or fever     (normal spontaneous vaginal delivery)       ferrous fumarate 65 mg ([place]. FE)-Vitamin C 125 mg  MG Tabs tablet    VITRON C    60 tablet    Take 1 tablet by mouth daily    Postpartum anemia       ibuprofen 800 MG tablet    ADVIL/MOTRIN    90 tablet    Take 1 tablet (800 mg) by mouth every 6 hours as needed for other (cramping)     (normal spontaneous vaginal delivery)       labetalol 200 MG tablet    NORMODYNE    90 tablet    Take 1 tablet (200 mg) by mouth 3 times daily    Pre-eclampsia in postpartum period       NIFEdipine ER 30 MG Tb24    ADALAT CC    30 tablet    Take 1 tablet (30 mg) by mouth daily    Pre-eclampsia in postpartum period

## 2018-10-09 ENCOUNTER — DOCUMENTATION ONLY (OUTPATIENT)
Dept: CARE COORDINATION | Facility: CLINIC | Age: 28
End: 2018-10-09

## 2018-10-09 ENCOUNTER — CARE COORDINATION (OUTPATIENT)
Dept: CARE COORDINATION | Facility: CLINIC | Age: 28
End: 2018-10-09

## 2018-10-09 NOTE — PROGRESS NOTES
SW sent following referral options for therapy from providers from [facility] [place] via email. SW attempted to phone but number did not seem to be active.     Divya Nunn ([phone]) - [facility] [email or web address]     Tanisha Sanches ([phone]) and Su Pritchett ([phone]) - [facility] [place] [email or web address]     Su Huston ([phone]) - [facility]  [email or web address]     Kjerstin Rydeen, [facility]   Social Worker  Maternal Child Health   Direct: [phone]  Pager: [phone]

## 2018-10-10 NOTE — PROGRESS NOTES
received a follow-up referral from the maternal health , accompanied with contextual information.  completed a chart review and discovered Libby has an upcoming appointment with Dr. Siu on November 7th.  consulted with Dr. Siu to coordinate care. In collaboration, josafatr agreed to be available after Libby's appointment to assess for ongoing needs.    Writer will remain available if any questions or concerns arise before this scheduled visit.     ALEJANDRA Abad, [facility]  Clinical     [facility]   Pediatric Outpatient Clinics/Long Term Follow-Up/Women s Health  [email or web address]   Office: [phone]   Pager: [phone]

## 2018-11-07 ENCOUNTER — OFFICE VISIT (OUTPATIENT)
Dept: CARE COORDINATION | Facility: CLINIC | Age: 28
End: 2018-11-07

## 2018-11-07 ENCOUNTER — OFFICE VISIT (OUTPATIENT)
Dept: OBGYN | Facility: CLINIC | Age: 28
End: 2018-11-07
Attending: OBSTETRICS & GYNECOLOGY
Payer: COMMERCIAL

## 2018-11-07 VITALS
HEIGHT: 65 IN | BODY MASS INDEX: 26.26 KG/M2 | SYSTOLIC BLOOD PRESSURE: 132 MMHG | HEART RATE: 75 BPM | WEIGHT: 157.6 LBS | DIASTOLIC BLOOD PRESSURE: 90 MMHG

## 2018-11-07 DIAGNOSIS — Z71.9 ENCOUNTER FOR COUNSELING: Primary | ICD-10-CM

## 2018-11-07 DIAGNOSIS — K59.00 CONSTIPATION, UNSPECIFIED CONSTIPATION TYPE: ICD-10-CM

## 2018-11-07 DIAGNOSIS — Z30.430 ENCOUNTER FOR INSERTION OF INTRAUTERINE CONTRACEPTIVE DEVICE: ICD-10-CM

## 2018-11-07 PROCEDURE — 25000125 ZZHC RX 250: Mod: ZF

## 2018-11-07 PROCEDURE — 58300 INSERT INTRAUTERINE DEVICE: CPT | Mod: ZF | Performed by: OBSTETRICS & GYNECOLOGY

## 2018-11-07 RX ORDER — SERTRALINE HYDROCHLORIDE 25 MG/1
25 TABLET, FILM COATED ORAL DAILY
Qty: 30 TABLET | Refills: 0 | Status: SHIPPED | OUTPATIENT
Start: 2018-11-07 | End: 2018-12-04

## 2018-11-07 RX ORDER — AMOXICILLIN 250 MG
1 CAPSULE ORAL DAILY
Qty: 60 TABLET | Refills: 1 | Status: SHIPPED | OUTPATIENT
Start: 2018-11-07

## 2018-11-07 ASSESSMENT — ANXIETY QUESTIONNAIRES
3. WORRYING TOO MUCH ABOUT DIFFERENT THINGS: SEVERAL DAYS
7. FEELING AFRAID AS IF SOMETHING AWFUL MIGHT HAPPEN: NOT AT ALL
6. BECOMING EASILY ANNOYED OR IRRITABLE: SEVERAL DAYS
2. NOT BEING ABLE TO STOP OR CONTROL WORRYING: SEVERAL DAYS
GAD7 TOTAL SCORE: 4
5. BEING SO RESTLESS THAT IT IS HARD TO SIT STILL: NOT AT ALL
1. FEELING NERVOUS, ANXIOUS, OR ON EDGE: NOT AT ALL

## 2018-11-07 ASSESSMENT — PATIENT HEALTH QUESTIONNAIRE - PHQ9
5. POOR APPETITE OR OVEREATING: SEVERAL DAYS
SUM OF ALL RESPONSES TO PHQ QUESTIONS 1-9: 7

## 2018-11-07 NOTE — LETTER
"2018       RE: Libby Mahajan  [address] [facility] [address]     Dear Colleague,    Thank you for referring your patient, Libby Mahajan, to the [facility] at [facility]. Please see a copy of my visit note below.    6 Week Postpartum Visit Note    S:  Libby Mahajan is a 28 year old  here for her 6-week postpartum checkup. Is s/p  on  with CNM service. Pregnancy complications included cHTN, was started on labetalol at 8w GA but stopped mid pregnancy. Was readmitted on 10/4 due to elevated BP noted at clinic visit. IV labetalol was administered for sustained severe range BPs, and she was maintained on IV magnesium for seizure prophylaxis x 24 hours. Patient was discharged on labetalol 200 mg TID and Nifedipine 30 mg daily. HELLP labs were wnl on admission with UPC of 0.18.     Since that time, she has been feeling \"ok for the most part.\" Endorses fatigue, stress, loss of energy, anhedonia and feeling depressed at times, though denies SI/HI. Her  lives out of the country currently, but she is hoping he will come here next month. No date is set. Her mom provides childcare assistance during the day, but the patient feels alone at night. Baby is doing well. Breast feeds \"constantly,\" though Libby is still occasionally supplementing with formula. She stopped bleeding 2 weeks ago but noted some spotting on 11/3. Has not yet resumed menses. Endorses constipation. Takes dulcolax when she has not had a BM in a few days. No problems voiding. Denies intercourse since delivery. Of note, patient is no longer taking anti-hypertensives. States she took for 2-3 weeks then her \"body felt normal again,\" so she stopped.    Delivery Date: 18.    Delivering provider:  Daisha Bonds CNM  Type of delivery:  .  Perineum:  intact.     Infant gender:  girl, weight 9 pounds 1 oz.  Feeding Method:   and Bottlefed.  " "Complications reported with feeding:  none, infant thriving .    Bleeding:  None.  Duration:  3 weeks.  Menses resumed:  No  Bowel/Urinary problems:  Yes  - constipation    PHQ-9 score: 7    Contraception Planned:  IUD Mirena  She  has not had intercourse since delivery..    Current tobacco use:  No  ================================================================  ROS: 10 point ROS neg other than the symptoms noted above in the HPI.     O:  EXAM:  /90  Pulse 75  Ht 1.651 m (5' 5\")  Wt 71.5 kg (157 lb 9.6 oz)  LMP 12/27/2017  BMI 26.23 kg/m2  General: alert, oriented, sitting comfortably  Psych: low mood and flat affect  Abdomen: soft, non-tender, no masses appreciated.  Vulva:  Normal genitalia and Bartholin's, Urethra, Fleming's normal  Vagina:  normal with good muscle tone, rugated, clear physiologic discharge  Cervix:  Multiparous, and no lesions.    Uterus:  anteflexed, small, smooth, firm, mobile w/o pain    Adnexa:  Within normal limits and No masses, nodularity, tenderness    IUD Insertion Note:      Time Out - \"Pause for the Cause\"  Just before the procedure begins, through verbal and active participation of team members, verify:    Initials   Patient Name    MRT   Patient Date of Birth MRT   Procedure to be performed  MRT     Consent:  Risks, benefits of treatment, and no treatment were discussed.  Patient's questions were elicited and answered.  and Written consent signed and scanned into medical record.    After informed consent was obtained from the patient, a speculum was placed in the vagina to visualized the cervix. The cervix was then swabbed with a betadine prep x 3. Tenaculum was placed at the 12 o'clock position on the cervix and the uterus sounded to 8 cm. The Mirena  IUD was then placed in the usual fashion under sterile technique. Strings were clipped about 3 cm from the cervical os. Tenaculum was removed and cervix was hemostatic. There were no complications. EBL was minimal. The " patient tolerated the procedure well.    Lot # [id number]  Exp: 2021    A:   28 year old  6 weeks s/p  with cHTN, readmitted postpartum for superimposed pre-eclampsia with SF. Here in clinic today for postpartum visit. Concerns for possible postpartum depression.     P:  Post-partum depression   [place] out [place] the country. Patient feeling overwhelmed, sad, anhedonic, and fatigued. Current support system includes her mother. Social work did see her in clinic today. We discussed use of pharmacologic and non-pharmacologic treatments for her mood symptoms. Patient does not feel she has time for therapy. Is willing to trial anti-depressant medication. Recommended use of serotonin specific reuptake inhibitor. Discussed minimal side effects. Patient agrees to plan for low dose Zoloft with follow up in 4 weeks.    - Start Zoloft 25 mg po daily  - Appointment for mood follow-up in 4 weeks, can consider increased dose at that time prn  - Consider therapy in the future if patient amenable    Contraception:   Mirena IUD inserted today. Given 's handouts, including when to have IUD removed, list of danger s/sx, side effects and follow up recommended. Encouraged condom use for prevention of STD. Back up contraception advised for 7 days if progestin method. Advised to call for any fever, for prolonged or severe pain or bleeding, abnormal vaginal discharge, or unable to palpate strings. She was advised to use pain medications (ibuprofen) as needed for mild to moderate pain. Advised to follow-up in clinic in 4-6 weeks for IUD string check if unable to find strings or as directed by provider.   - Follow up in 4 weeks for mood check (as above) as well as string check    Chronic Hypertension  Patient reports not taking her anti-hypertensives currently. BP today 132/90. No indication to resume anti-hypertensives at this time. Will continue to monitor with BP recheck when she returns in 4 weeks. Instructed to  call with headache, vision changes, lightheadedness, chest pain, shortness of breath, RUQ pain, or other concerning symptoms.    Constipation  Discussed daily use of stool softener. Pericolace sent to her pharmacy.    Patient staffed with Dr. Sherron Pandey MD  PGY-1 Department of Obstetrics, Gynecology, and Women's Health  11/07/18    [facility] Staff Note:  I examined Libby Mahajan on 11/7/2018 with Dr. Pandey and agree with the presentation, exam and plan of care documented in this note with edits by me. I was present for the entire procedure as noted above.    Maricarmen Siu MD  11/7/18          Again, thank you for allowing me to participate in the care of your patient.      Sincerely,    Maricarmen Siu MD

## 2018-11-07 NOTE — NURSING NOTE
SUBJECTIVE:   Libby Mahajan is here for her 6-week postpartum checkup.     PHQ-9 score:   Hx of Abuse:  No    Delivery Date: 18.    Delivering provider:  Daisha Bonds CNM.    Type of delivery:  .  Perineum:  in tact.     Delivery complications: None  Infant gender:  girl, weight 9 pounds 1 oz.  Feeding Method:  .  Complications reported with feeding:  none, infant thriving .    Bleeding:  None.  Duration:  2-3 weeks.  Menses resumed:  No  Bowel/Urinary problems:  No    Contraception Planned:  IUD Paragard  She  has not had intercourse since delivery..

## 2018-11-07 NOTE — MR AVS SNAPSHOT
After Visit Summary   11/7/2018    Libby Mahajan    MRN: [id number]           Patient Information     Date Of Birth          1990        Visit Information        Provider Department      11/7/2018 3:43 PM Marixa Dinh LGSW [facility]        Today's Diagnoses     Encounter for counseling    -  1       Follow-ups after your visit        Your next 10 appointments already scheduled     Dec 04, 2018 11:00 AM CST   RETURN EXTENDED with Maricarmen Siu MD   [facility] ([facility])    [facility] [address]  [address]  [address]  [facility] [id number] [phone]              Who to contact     If you have questions or need follow up information about today's clinic visit or your schedule please contact [facility] directly at No information on file..  Normal or non-critical lab and imaging results will be communicated to you by MyChart, letter or phone within 4 business days after the clinic has received the results. If you do not hear from us within 7 days, please contact the clinic through MyChart or phone. If you have a critical or abnormal lab result, we will notify you by phone as soon as possible.  Submit refill requests through [employer] or call your pharmacy and they will forward the refill request to us. Please allow 3 business days for your refill to be completed.          Additional Information About Your Visit        Care EveryWhere ID     This is your Care EveryWhere ID. This could be used by other organizations to access your [place] medical records  [id number]        Your Vitals Were     Last Period                   12/27/2017            Blood Pressure from Last 3 Encounters:   11/07/18 132/90   10/08/18 112/78   10/06/18 (!) 139/92    Weight from Last 3 Encounters:   11/07/18 71.5 kg (157 lb 9.6 oz)   10/06/18 75.6 kg (166 lb 10.7 oz)   10/04/18 76.8 kg (169 lb 4.8 oz)              Today, you had the  following     No orders found for display         Today's Medication Changes          These changes are accurate as of 11/7/18 11:59 PM.  If you have any questions, ask your nurse or doctor.               Start taking these medicines.        Dose/Directions    levonorgestrel 20 MCG/24HR IUD   Commonly known as:  MIRENA   Used for:  Encounter for insertion of intrauterine contraceptive device   Started by:  Maricarmen Siu MD        Dose:  1 each   1 each (20 mcg) by Intrauterine route continuous   Refills:  0       senna-docusate 8.6-50 MG per tablet   Commonly known as:  SENOKOT-S;PERICOLACE   Used for:  Constipation, unspecified constipation type   Started by:  Maricarmen Siu MD        Dose:  1 tablet   Take 1 tablet by mouth daily   Quantity:  60 tablet   Refills:  1       sertraline 25 MG tablet   Commonly known as:  ZOLOFT   Used for:  Postpartum depression   Started by:  Maricarmen Siu MD        Dose:  25 mg   Take 1 tablet (25 mg) by mouth daily   Quantity:  30 tablet   Refills:  0         Stop taking these medicines if you haven't already. Please contact your care team if you have questions.     ferrous fumarate 65 mg ([place]. FE)-Vitamin C 125 mg  MG Tabs tablet   Commonly known as:  VITRON C   Stopped by:  Maricarmen Siu MD           ibuprofen 800 MG tablet   Commonly known as:  ADVIL/MOTRIN   Stopped by:  Maricarmen Siu MD                Where to get your medicines      These medications were sent to [id number] [place], [place] - [address]  [address], [place] [address]     Phone:  [phone]     senna-docusate 8.6-50 MG per tablet    sertraline 25 MG tablet         Some of these will need a paper prescription and others can be bought over the counter.  Ask your nurse if you have questions.     You don't need a prescription for these medications     levonorgestrel 20 MCG/24HR IUD                Primary Care Provider Office Phone # Fax #     [facility] [phone]       [address]  [facility] [address]        Equal Access to Services     GAGE FIGUEROA : Hadii aad ku hadjenajce Courtneyali, wayaseminda luqadaha, qamary ellenta kaalmada subha, ladi barrios maheshyolie barrera margaret good. So [facility] [phone].    ATENCIÓN: Si habla español, tiene a bhat disposición servicios gratuitos de asistencia lingüística. Llame al [phone].    We comply with applicable federal civil rights laws and [place] laws. We do not discriminate on the basis of race, color, national origin, age, disability, sex, sexual orientation, or gender identity.            Thank you!     Thank you for choosing  [facility]  for your care. Our goal is always to provide you with excellent care. Hearing back from our patients is one way we can continue to improve our services. Please take a few minutes to complete the written survey that you may receive in the mail after your visit with us. Thank you!             Your Updated Medication List - Protect others around you: Learn how to safely use, store and throw away your medicines at www.disposemymeds.org.          This list is accurate as of 18 11:59 PM.  Always use your most recent med list.                   Brand Name Dispense Instructions for use Diagnosis    acetaminophen 325 MG tablet    TYLENOL    100 tablet    Take 2 tablets (650 mg) by mouth every 4 hours as needed for mild pain or fever     (normal spontaneous vaginal delivery)       labetalol 200 MG tablet    NORMODYNE    90 tablet    Take 1 tablet (200 mg) by mouth 3 times daily    Pre-eclampsia in postpartum period       levonorgestrel 20 MCG/24HR IUD    MIRENA     1 each (20 mcg) by Intrauterine route continuous    Encounter for insertion of intrauterine contraceptive device       NIFEdipine ER 30 MG Tb24    ADALAT CC    30 tablet    Take 1 tablet (30 mg) by mouth daily    Pre-eclampsia in postpartum period       senna-docusate  8.6-50 MG per tablet    SENOKOT-S;PERICOLACE    60 tablet    Take 1 tablet by mouth daily    Constipation, unspecified constipation type       sertraline 25 MG tablet    ZOLOFT    30 tablet    Take 1 tablet (25 mg) by mouth daily    Postpartum depression

## 2018-11-07 NOTE — LETTER
Date:November 13, 2018      Patient was self referred, no letter generated. Do not send.        [facility] Information
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
## 2018-11-07 NOTE — PROGRESS NOTES
"6 Week Postpartum Visit Note    S:  Libby Mahajan is a 28 year old  here for her 6-week postpartum checkup. Is s/p  on  with [facility] service. Pregnancy complications included cHTN, was started on labetalol at 8w GA but stopped mid pregnancy. Was readmitted on 10/4 due to elevated BP noted at clinic visit. IV labetalol was administered for sustained severe range BPs, and she was maintained on IV magnesium for seizure prophylaxis x 24 hours. Patient was discharged on labetalol 200 mg TID and Nifedipine 30 mg daily. HELLP labs were wnl on admission with UPC of 0.18.     Since that time, she has been feeling \"ok for the most part.\" Endorses fatigue, stress, loss of energy, anhedonia and feeling depressed at times, though denies SI/HI. Her  lives out of the country currently, but she is hoping he will come here next month. No date is set. Her mom provides childcare assistance during the day, but the patient feels alone at night. Baby is doing well. Breast feeds \"constantly,\" though Libby is still occasionally supplementing with formula. She stopped bleeding 2 weeks ago but noted some spotting on 11/3. Has not yet resumed menses. Endorses constipation. Takes dulcolax when she has not had a BM in a few days. No problems voiding. Denies intercourse since delivery. Of note, patient is no longer taking anti-hypertensives. States she took for 2-3 weeks then her \"body felt normal again,\" so she stopped.    Delivery Date: 18.    Delivering provider:  Daisha Bonds CNM  Type of delivery:  .  Perineum:  intact.     Infant gender:  girl, weight 9 pounds 1 oz.  Feeding Method:   and Bottlefed.  Complications reported with feeding:  none, infant thriving .    Bleeding:  None.  Duration:  3 weeks.  Menses resumed:  No  Bowel/Urinary problems:  Yes  - constipation    PHQ-9 score: 7    Contraception Planned:  IUD Mirena  She  has not had intercourse since delivery..    Current tobacco use:  " "No  ================================================================  ROS: 10 point ROS neg other than the symptoms noted above in the HPI.     O:  EXAM:  /90  Pulse 75  Ht 1.651 m (5' 5\")  Wt 71.5 kg (157 lb 9.6 oz)  LMP 2017  BMI 26.23 kg/m2  General: alert, oriented, sitting comfortably  Psych: low mood and flat affect  Abdomen: soft, non-tender, no masses appreciated.  Vulva:  Normal genitalia and Bartholin's, Urethra, Hitchcock's normal  Vagina:  normal with good muscle tone, rugated, clear physiologic discharge  Cervix:  Multiparous, and no lesions.    Uterus:  anteflexed, small, smooth, firm, mobile w/o pain    Adnexa:  Within normal limits and No masses, nodularity, tenderness    IUD Insertion Note:      Time Out - \"Pause for the Cause\"  Just before the procedure begins, through verbal and active participation of team members, verify:    Initials   Patient Name    MRT   Patient Date of Birth MRT   Procedure to be performed  MRT     Consent:  Risks, benefits of treatment, and no treatment were discussed.  Patient's questions were elicited and answered.  and Written consent signed and scanned into medical record.    After informed consent was obtained from the patient, a speculum was placed in the vagina to visualized the cervix. The cervix was then swabbed with a betadine prep x 3. Tenaculum was placed at the 12 o'clock position on the cervix and the uterus sounded to 8 cm. The Mirena  IUD was then placed in the usual fashion under sterile technique. Strings were clipped about 3 cm from the cervical os. Tenaculum was removed and cervix was hemostatic. There were no complications. EBL was minimal. The patient tolerated the procedure well.    Lot # [id number]  Exp: 2021    A:   28 year old  6 weeks s/p  with cHTN, readmitted postpartum for superimposed pre-eclampsia with SF. Here in clinic today for postpartum visit. Concerns for possible postpartum depression.     P:  Post-partum " depression   [place]. Patient feeling overwhelmed, sad, anhedonic, and fatigued. Current support system includes her mother. Social work did see her in clinic today. We discussed use of pharmacologic and non-pharmacologic treatments for her mood symptoms. Patient does not feel she has time for therapy. Is willing to trial anti-depressant medication. Recommended use of serotonin specific reuptake inhibitor. Discussed minimal side effects. Patient agrees to plan for low dose Zoloft with follow up in 4 weeks.    - Start Zoloft 25 mg po daily  - Appointment for mood follow-up in 4 weeks, can consider increased dose at that time prn  - Consider therapy in the future if patient amenable    Contraception:   Mirena IUD inserted today. Given 's handouts, including when to have IUD removed, list of danger s/sx, side effects and follow up recommended. Encouraged condom use for prevention of STD. Back up contraception advised for 7 days if progestin method. Advised to call for any fever, for prolonged or severe pain or bleeding, abnormal vaginal discharge, or unable to palpate strings. She was advised to use pain medications (ibuprofen) as needed for mild to moderate pain. Advised to follow-up in clinic in 4-6 weeks for IUD string check if unable to find strings or as directed by provider.   - Follow up in 4 weeks for mood check (as above) as well as string check    Chronic Hypertension  Patient reports not taking her anti-hypertensives currently. BP today 132/90. No indication to resume anti-hypertensives at this time. Will continue to monitor with BP recheck when she returns in 4 weeks. Instructed to call with headache, vision changes, lightheadedness, chest pain, shortness of breath, RUQ pain, or other concerning symptoms.    Constipation  Discussed daily use of stool softener. Pericolace sent to her pharmacy.    Patient staffed with Dr. Sherron Pandey MD  PGY-1  Department of Obstetrics, Gynecology, and Women's Health  11/07/18    [facility] Staff Note:  I examined Libby Mahajan on 11/7/2018 with Dr. Pandey and agree with the presentation, exam and plan of care documented in this note with edits by me. I was present for the entire procedure as noted above.    Maricarmen Siu MD  11/7/18

## 2018-11-07 NOTE — MR AVS SNAPSHOT
"              After Visit Summary   11/7/2018    Libby Mahajan    MRN: [id number]           Patient Information     Date Of Birth          1990        Visit Information        Provider Department      11/7/2018 2:00 PM Maricarmen Siu MD [facility]        Today's Diagnoses     Routine postpartum follow-up    -  1    Postpartum depression        Constipation, unspecified constipation type        Encounter for insertion of intrauterine contraceptive device           Follow-ups after your visit        Follow-up notes from your care team     Return in about 4 weeks (around 12/5/2018) for Mood check, IUD string check.      Your next 10 appointments already scheduled     Dec 04, 2018 11:00 AM CST   RETURN EXTENDED with Maricarmen Siu MD   [facility] ([facility])    [facility] [address]  [address]  [facility] [place] [facility] [address] [phone]              Who to contact     Please call your clinic at [phone] to:    Ask questions about your health    Make or cancel appointments    Discuss your medicines    Learn about your test results    Speak to your doctor            Additional Information About Your Visit        Care EveryWhere ID     This is your Care EveryWhere ID. This could be used by other organizations to access your [facility] medical records  [id number]        Your Vitals Were     Pulse Height Last Period BMI (Body Mass Index)          75 1.651 m (5' 5\") 12/27/2017 26.23 kg/m2         Blood Pressure from Last 3 Encounters:   11/07/18 132/90   10/08/18 112/78   10/06/18 (!) 139/92    Weight from Last 3 Encounters:   11/07/18 71.5 kg (157 lb 9.6 oz)   10/06/18 75.6 kg (166 lb 10.7 oz)   10/04/18 76.8 kg (169 lb 4.8 oz)              We Performed the Following     HC LEVONORGESTREL IU 52MG 5 YR     INSERTION INTRAUTERINE DEVICE          Today's Medication Changes          These changes are accurate as of 11/7/18 11:59 " PM.  If you have any questions, ask your nurse or doctor.               Start taking these medicines.        Dose/Directions    levonorgestrel 20 MCG/24HR IUD   Commonly known as:  MIRENA   Used for:  Encounter for insertion of intrauterine contraceptive device   Started by:  Maricarmen Siu MD        Dose:  1 each   1 each (20 mcg) by Intrauterine route continuous   Refills:  0       senna-docusate 8.6-50 MG per tablet   Commonly known as:  SENOKOT-S;PERICOLACE   Used for:  Constipation, unspecified constipation type   Started by:  Maricarmen Siu MD        Dose:  1 tablet   Take 1 tablet by mouth daily   Quantity:  60 tablet   Refills:  1       sertraline 25 MG tablet   Commonly known as:  ZOLOFT   Used for:  Postpartum depression   Started by:  Maricarmen Siu MD        Dose:  25 mg   Take 1 tablet (25 mg) by mouth daily   Quantity:  30 tablet   Refills:  0         Stop taking these medicines if you haven't already. Please contact your care team if you have questions.     ferrous fumarate 65 mg ([place]. FE)-Vitamin C 125 mg  MG Tabs tablet   Commonly known as:  VITRON C   Stopped by:  Maricarmen Siu MD           ibuprofen 800 MG tablet   Commonly known as:  ADVIL/MOTRIN   Stopped by:  Maricarmen Siu MD                Where to get your medicines      These medications were sent to [id number] - [place], [place] - [address], [place] [address]     Phone:  [phone]     senna-docusate 8.6-50 MG per tablet    sertraline 25 MG tablet         Some of these will need a paper prescription and others can be bought over the counter.  Ask your nurse if you have questions.     You don't need a prescription for these medications     levonorgestrel 20 MCG/24HR IUD                Primary Care Provider Office Phone # Fax #    [facility] [phone]       [address], [address]  [facility] [address]        Equal Access to  Services     [facility]: Hadii aad ku hadjenjace Rulajustin, wayaseminda luqadaha, qaybta kaalmada subha, ladi robles . So [facility] [phone].    ATENCIÓN: Si habla khurram, tiene a bhat disposición servicios gratuitos de asistencia lingüística. Llame al [phone].    We comply with applicable federal civil rights laws and [place] laws. We do not discriminate on the basis of race, color, national origin, age, disability, sex, sexual orientation, or gender identity.            Thank you!     Thank you for choosing [facility]  for your care. Our goal is always to provide you with excellent care. Hearing back from our patients is one way we can continue to improve our services. Please take a few minutes to complete the written survey that you may receive in the mail after your visit with us. Thank you!             Your Updated Medication List - Protect others around you: Learn how to safely use, store and throw away your medicines at www.disposemymeds.org.          This list is accurate as of 18 11:59 PM.  Always use your most recent med list.                   Brand Name Dispense Instructions for use Diagnosis    acetaminophen 325 MG tablet    TYLENOL    100 tablet    Take 2 tablets (650 mg) by mouth every 4 hours as needed for mild pain or fever     (normal spontaneous vaginal delivery)       labetalol 200 MG tablet    NORMODYNE    90 tablet    Take 1 tablet (200 mg) by mouth 3 times daily    Pre-eclampsia in postpartum period       levonorgestrel 20 MCG/24HR IUD    MIRENA     1 each (20 mcg) by Intrauterine route continuous    Encounter for insertion of intrauterine contraceptive device       NIFEdipine ER 30 MG Tb24    ADALAT CC    30 tablet    Take 1 tablet (30 mg) by mouth daily    Pre-eclampsia in postpartum period       senna-docusate 8.6-50 MG per tablet    SENOKOT-S;PERICOLACE    60 tablet    Take 1 tablet by mouth daily    Constipation, unspecified  constipation type       sertraline 25 MG tablet    ZOLOFT    30 tablet    Take 1 tablet (25 mg) by mouth daily    Postpartum depression

## 2018-11-08 ENCOUNTER — TELEPHONE (OUTPATIENT)
Dept: CARE COORDINATION | Facility: CLINIC | Age: 28
End: 2018-11-08

## 2018-11-08 ASSESSMENT — ANXIETY QUESTIONNAIRES: GAD7 TOTAL SCORE: 4
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
## 2018-11-14 ENCOUNTER — DOCUMENTATION ONLY (OUTPATIENT)
Dept: CARE COORDINATION | Facility: CLINIC | Age: 28
End: 2018-11-14

## 2018-11-14 NOTE — PROGRESS NOTES
received a phone call from the early childhood screeners inquiring about the patient s son s date of birth.  sent an e-mail to Joryamberyanira to inquire about this information. Libby provided these details and reported she has been doing well. Libby provided an update on her [facility] application and stated this was still pending. Writer stated this was great news in the right direction. Writer informed Libby she should be receiving a phone call from [employer] in the near future.  Rajr called and left a voicemail for the early childhood screener, Funmilayo. Writer will remain available for continued care coordination.   ALEJANDRA Abad, [facility]  Clinical     [facility]   Pediatric Outpatient Clinics/Long Term Follow-Up/Women s Health  [email or web address]   Office: [phone]   Pager: [phone]

## 2018-12-04 ENCOUNTER — OFFICE VISIT (OUTPATIENT)
Dept: OBGYN | Facility: CLINIC | Age: 28
End: 2018-12-04
Attending: OBSTETRICS & GYNECOLOGY
Payer: COMMERCIAL

## 2018-12-04 VITALS
WEIGHT: 151 LBS | SYSTOLIC BLOOD PRESSURE: 120 MMHG | HEIGHT: 65 IN | HEART RATE: 81 BPM | BODY MASS INDEX: 25.16 KG/M2 | DIASTOLIC BLOOD PRESSURE: 81 MMHG

## 2018-12-04 PROCEDURE — G0463 HOSPITAL OUTPT CLINIC VISIT: HCPCS | Mod: ZF

## 2018-12-04 RX ORDER — SERTRALINE HYDROCHLORIDE 25 MG/1
25 TABLET, FILM COATED ORAL DAILY
Qty: 30 TABLET | Refills: 11 | Status: SHIPPED | OUTPATIENT
Start: 2018-12-04

## 2018-12-04 ASSESSMENT — PAIN SCALES - GENERAL: PAINLEVEL: NO PAIN (0)

## 2018-12-04 NOTE — MR AVS SNAPSHOT
"              After Visit Summary   12/4/2018    Libby Mahajan    MRN: [id number]           Patient Information     Date Of Birth          1990        Visit Information        Provider Department      12/4/2018 11:00 AM Maricarmen Siu MD [facility]        Today's Diagnoses     Postpartum depression           Follow-ups after your visit        Who to contact     Please call your clinic at [phone] to:    Ask questions about your health    Make or cancel appointments    Discuss your medicines    Learn about your test results    Speak to your doctor            Additional Information About Your Visit        Care EveryWhere ID     This is your Care EveryWhere ID. This could be used by other organizations to access your [place] medical records  [id number]        Your Vitals Were     Pulse Height Last Period Breastfeeding? BMI (Body Mass Index)       81 1.651 m (5' 5\") 12/27/2017 Yes 25.13 kg/m2        Blood Pressure from Last 3 Encounters:   12/04/18 120/81   11/07/18 132/90   10/08/18 112/78    Weight from Last 3 Encounters:   12/04/18 68.5 kg (151 lb)   11/07/18 71.5 kg (157 lb 9.6 oz)   10/06/18 75.6 kg (166 lb 10.7 oz)              Today, you had the following     No orders found for display         Today's Medication Changes          These changes are accurate as of 12/4/18  2:29 PM.  If you have any questions, ask your nurse or doctor.               Stop taking these medicines if you haven't already. Please contact your care team if you have questions.     labetalol 200 MG tablet   Commonly known as:  NORMODYNE   Stopped by:  Maricarmen Siu MD           NIFEdipine ER 30 MG 24 hr tablet   Commonly known as:  ADALAT CC   Stopped by:  Maricarmen Siu MD                Where to get your medicines      These medications were sent to [id number] - [place], [place] - [address]  [address], [place] [address]     Phone:  [phone]     " sertraline 25 MG tablet                Primary Care Provider Office Phone # Fax #    [facility] [phone]       [address] [place], [place] [address]  [facility] [address]        Equal Access to Services     GAGE FIGUEROA : Hadii aad ku hadmiranda Solucyali, waaxda luqadaha, qaybta kaalmada adecielo, ladi hamin hayaachristie aragonyolie barrera margaret good. So [facility] [phone].    ATENCIÓN: Si habla español, tiene a bhat disposición servicios gratuitos de asistencia lingüística. Llame al [phone].    We comply with applicable federal civil rights laws and [place] laws. We do not discriminate on the basis of race, color, national origin, age, disability, sex, sexual orientation, or gender identity.            Thank you!     Thank you for choosing [facility]  for your care. Our goal is always to provide you with excellent care. Hearing back from our patients is one way we can continue to improve our services. Please take a few minutes to complete the written survey that you may receive in the mail after your visit with us. Thank you!             Your Updated Medication List - Protect others around you: Learn how to safely use, store and throw away your medicines at www.disposemymeds.org.          This list is accurate as of 18  2:29 PM.  Always use your most recent med list.                   Brand Name Dispense Instructions for use Diagnosis    acetaminophen 325 MG tablet    TYLENOL    100 tablet    Take 2 tablets (650 mg) by mouth every 4 hours as needed for mild pain or fever     (normal spontaneous vaginal delivery)       levonorgestrel 20 MCG/24HR IUD    MIRENA     1 each (20 mcg) by Intrauterine route continuous    Encounter for insertion of intrauterine contraceptive device       senna-docusate 8.6-50 MG tablet    SENOKOT-S/PERICOLACE    60 tablet    Take 1 tablet by mouth daily    Constipation, unspecified constipation type       sertraline 25 MG tablet    ZOLOFT    30  tablet    Take 1 tablet (25 mg) by mouth daily    Postpartum depression

## 2018-12-04 NOTE — NURSING NOTE
Chief Complaint   Patient presents with     RECHECK     IUD string check , mood check   Karen Guerrero LPN

## 2018-12-04 NOTE — LETTER
"2018       RE: Libby Mahajan  [address] [place] [address]     Dear Colleague,    Thank you for referring your patient, Libby Mahajan, to the [facility]. Please see a copy of my visit note below.    [facility]  Follow-Up Visit    S: Ms. Libby Mahajan is a 28 year old  here for a mood check after beginning an serotonin specific reuptake inhibitor for postpartum depression. Patient has been on Zoloft for the past four weeks, feels that her symptoms of depression have improved. Patient is not experiencing any side effects from the medication. Her  will be moving to [place] this month, and patient feels that her symptoms will improve even more with his support. Patient had a Mirena IUD placed at her post-partum visit, states she had cramping for a week or two after placement that resolved but is having continued daily spotting. Would like to have strings checked today.     ROS: problem-focused ROS performed and negative except for as above.     O:  /81  Pulse 81  Ht 1.651 m (5' 5\")  Wt 68.5 kg (151 lb)  LMP 2017  Breastfeeding? Yes  BMI 25.13 kg/m2  Gen: Well-appearing, NAD  HEENT: Normocephalic, atraumatic  CV:  Well perfused  Pulm: Nonlabored breathing, no coughs or wheezes  Ext: No LE edema, extremities warm and well perfused    Pelvic:  Normal appearing external female genitalia. Normal hair distribution. Vagina is without lesions. Thin, white discharge. Cervix multiparous, no lesions. Two IUD strings visualized extending from cervical os.    A/P:  Ms. Libby Mahajan is a 28 year old  here for refill of Zoloft. Patient not experiencing any side effects of medication and feels that the dose is effective for managing her symptoms of depression. Counseled patient that it is recommended that she stay on the serotonin specific reuptake inhibitor for minimum of six months, at " which time she can decide whether to continue the medication or taper off. 1 year of refills ordered. Patient also concerned regarding location of IUD, reassured patient that strings are visible and that spotting is normal for the first few months with the IUD.     Also of note, last pap smear noted as negative in prenatal records 2/2018, but no formal lab requisition/report was scanned in. Had patient sign record release to get this in our records. If we do not receive, discussed with patient would recommend pap next year with annual exam.     Patient seen and discussed with Dr. Siu.    Keiry Puckett MD  Obstetrics and Gyncology, PGY-1  December 4, 2018 , 11:55 AM      [facility] Staff Note:  I examined Libby Mahajan on 12/4/2018 with Dr. Puckett and agree with the presentation, exam and plan of care documented in this note with edits by me.   Maricarmen Siu MD  12/4/2018            Again, thank you for allowing me to participate in the care of your patient.      Sincerely,    Maricarmen Siu MD

## 2018-12-04 NOTE — LETTER
Date:December 5, 2018      Patient was self referred, no letter generated. Do not send.        [facility] Health Information

## 2018-12-04 NOTE — PROGRESS NOTES
"[facility]  Follow-Up Visit    S: Ms. Libby Mahajan is a 28 year old  here for a mood check after beginning an serotonin specific reuptake inhibitor for postpartum depression. Patient has been on Zoloft for the past four weeks, feels that her symptoms of depression have improved. Patient is not experiencing any side effects from the medication. Her  will be moving to [place] this month, and patient feels that her symptoms will improve even more with his support. Patient had a Mirena IUD placed at her post-partum visit, states she had cramping for a week or two after placement that resolved but is having continued daily spotting. Would like to have strings checked today.     ROS: problem-focused ROS performed and negative except for as above.     O:  /81  Pulse 81  Ht 1.651 m (5' 5\")  Wt 68.5 kg (151 lb)  LMP 2017  Breastfeeding? Yes  BMI 25.13 kg/m2  Gen: Well-appearing, NAD  HEENT: Normocephalic, atraumatic  CV:  Well perfused  Pulm: Nonlabored breathing, no coughs or wheezes  Ext: No LE edema, extremities warm and well perfused    Pelvic:  Normal appearing external female genitalia. Normal hair distribution. Vagina is without lesions. Thin, white discharge. Cervix multiparous, no lesions. Two IUD strings visualized extending from cervical os.    A/P:  Ms. Libby Mahajan is a 28 year old  here for refill of Zoloft. Patient not experiencing any side effects of medication and feels that the dose is effective for managing her symptoms of depression. Counseled patient that it is recommended that she stay on the serotonin specific reuptake inhibitor for minimum of six months, at which time she can decide whether to continue the medication or taper off. 1 year of refills ordered. Patient also concerned regarding location of IUD, reassured patient that strings are visible and that spotting is normal for the first few months with the IUD.     Also of note, last pap smear " noted as negative in prenatal records 2/2018, but no formal lab requisition/report was scanned in. Had patient sign record release to get this in our records. If we do not receive, discussed with patient would recommend pap next year with annual exam.     Patient seen and discussed with Dr. Siu.    Keiry Puckett MD  Obstetrics and Gyncology, PGY-1  December 4, 2018 , 11:55 AM      S Staff Note:  I examined Libby Mahajan on 12/4/2018 with Dr. Puckett and agree with the presentation, exam and plan of care documented in this note with edits by me.   Maricarmen Siu MD  12/4/2018